# Patient Record
Sex: MALE | Race: WHITE | NOT HISPANIC OR LATINO | Employment: OTHER | URBAN - METROPOLITAN AREA
[De-identification: names, ages, dates, MRNs, and addresses within clinical notes are randomized per-mention and may not be internally consistent; named-entity substitution may affect disease eponyms.]

---

## 2017-05-10 ENCOUNTER — HOSPITAL ENCOUNTER (OUTPATIENT)
Dept: RADIATION ONCOLOGY | Facility: HOSPITAL | Age: 77
Setting detail: RADIATION/ONCOLOGY SERIES
Discharge: HOME | End: 2017-05-10

## 2017-05-10 ENCOUNTER — OFFICE VISIT (OUTPATIENT)
Dept: RADIATION ONCOLOGY | Facility: HOSPITAL | Age: 77
End: 2017-05-10

## 2017-05-10 VITALS
HEIGHT: 67 IN | TEMPERATURE: 97.2 F | HEART RATE: 67 BPM | DIASTOLIC BLOOD PRESSURE: 81 MMHG | OXYGEN SATURATION: 93 % | BODY MASS INDEX: 29.73 KG/M2 | WEIGHT: 189.4 LBS | RESPIRATION RATE: 18 BRPM | SYSTOLIC BLOOD PRESSURE: 168 MMHG

## 2017-05-10 DIAGNOSIS — C61 PROSTATE CANCER (HCC): Primary | ICD-10-CM

## 2017-05-10 PROCEDURE — G0463 HOSPITAL OUTPT CLINIC VISIT: HCPCS

## 2017-05-10 RX ORDER — NITROGLYCERIN 0.4 MG/1
0.4 TABLET SUBLINGUAL
COMMUNITY

## 2017-05-10 RX ORDER — METOPROLOL TARTRATE 50 MG/1
TABLET, FILM COATED ORAL
Refills: 1 | COMMUNITY
Start: 2017-03-03

## 2017-05-10 RX ORDER — DIAZEPAM 5 MG/1
5 TABLET ORAL 2 TIMES DAILY PRN
COMMUNITY
End: 2018-02-22

## 2017-05-10 RX ORDER — MELOXICAM 7.5 MG/1
TABLET ORAL
Refills: 3 | COMMUNITY
Start: 2017-04-09 | End: 2018-02-22

## 2017-05-10 RX ORDER — MIRTAZAPINE 30 MG/1
TABLET, FILM COATED ORAL
Refills: 3 | COMMUNITY
Start: 2017-03-30

## 2017-05-10 RX ORDER — PANTOPRAZOLE SODIUM 40 MG/1
TABLET, DELAYED RELEASE ORAL
Refills: 3 | COMMUNITY
Start: 2017-03-29

## 2017-05-10 RX ORDER — ESCITALOPRAM OXALATE 10 MG/1
TABLET ORAL
Refills: 2 | COMMUNITY
Start: 2017-04-09 | End: 2017-06-19

## 2017-05-10 RX ORDER — BLOOD SUGAR DIAGNOSTIC
STRIP MISCELLANEOUS
Refills: 4 | COMMUNITY
Start: 2017-04-26

## 2017-05-10 RX ORDER — AMLODIPINE BESYLATE 10 MG/1
TABLET ORAL
Refills: 3 | COMMUNITY
Start: 2017-03-29

## 2017-05-10 RX ORDER — DOXYCYCLINE 100 MG/1
CAPSULE ORAL
Refills: 0 | COMMUNITY
Start: 2017-03-21 | End: 2017-06-19

## 2017-05-10 RX ORDER — LOSARTAN POTASSIUM 50 MG/1
TABLET ORAL
Refills: 3 | COMMUNITY
Start: 2017-03-29 | End: 2018-02-22

## 2017-05-10 RX ORDER — ASPIRIN 81 MG/1
81 TABLET ORAL DAILY
COMMUNITY

## 2017-05-10 RX ORDER — SIMVASTATIN 80 MG
20 TABLET ORAL
Refills: 0 | COMMUNITY
Start: 2017-04-01 | End: 2018-02-22

## 2017-05-10 RX ORDER — INSULIN GLARGINE 300 U/ML
INJECTION, SOLUTION SUBCUTANEOUS
Refills: 5 | COMMUNITY
Start: 2017-04-24

## 2017-05-12 ENCOUNTER — APPOINTMENT (OUTPATIENT)
Dept: OTHER | Facility: HOSPITAL | Age: 77
End: 2017-05-12
Attending: RADIOLOGY

## 2017-05-12 DIAGNOSIS — Z00.6 ENCOUNTER FOR EXAMINATION FOR NORMAL COMPARISON OR CONTROL IN CLINICAL RESEARCH PROGRAM: ICD-10-CM

## 2017-05-17 ENCOUNTER — TELEPHONE (OUTPATIENT)
Dept: SOCIAL WORK | Facility: HOSPITAL | Age: 77
End: 2017-05-17

## 2017-05-30 DIAGNOSIS — C61 PROSTATE CANCER (HCC): Primary | ICD-10-CM

## 2017-06-09 ENCOUNTER — TELEPHONE (OUTPATIENT)
Dept: RADIATION ONCOLOGY | Facility: HOSPITAL | Age: 77
End: 2017-06-09

## 2017-06-09 NOTE — TELEPHONE ENCOUNTER
Spoke with pt's wife. Checking with pt to make sure fiducial placement was still on schedule with Dr. Hampton for 6/12/17. Pt's wife confirmed.  Also notified of the following appts:  6-19-17 @ 1528 [facility]  @ 0930 CK sim  @ 1000 CT sim  @ 1130 MRI for 1200 scan    We also discussed importance of  prostate protocol diet and gas-x 2 days prior and an enema the day of.   Instructed to be NPO except fluids for up to 2 hours prior to MRI. Wife verbalized understanding.

## 2017-06-15 ENCOUNTER — TELEPHONE (OUTPATIENT)
Dept: NUTRITION | Facility: HOSPITAL | Age: 77
End: 2017-06-15

## 2017-06-19 ENCOUNTER — DOCUMENTATION (OUTPATIENT)
Dept: RADIATION ONCOLOGY | Facility: HOSPITAL | Age: 77
End: 2017-06-19

## 2017-06-19 ENCOUNTER — OFFICE VISIT (OUTPATIENT)
Dept: RADIATION ONCOLOGY | Facility: HOSPITAL | Age: 77
End: 2017-06-19

## 2017-06-19 ENCOUNTER — HOSPITAL ENCOUNTER (OUTPATIENT)
Dept: MRI IMAGING | Facility: HOSPITAL | Age: 77
Discharge: HOME | End: 2017-06-19
Attending: RADIOLOGY

## 2017-06-19 ENCOUNTER — HOSPITAL ENCOUNTER (OUTPATIENT)
Dept: RADIATION ONCOLOGY | Facility: HOSPITAL | Age: 77
Setting detail: RADIATION/ONCOLOGY SERIES
Discharge: HOME | End: 2017-06-19

## 2017-06-19 VITALS
WEIGHT: 188.3 LBS | RESPIRATION RATE: 20 BRPM | DIASTOLIC BLOOD PRESSURE: 77 MMHG | SYSTOLIC BLOOD PRESSURE: 131 MMHG | OXYGEN SATURATION: 94 % | TEMPERATURE: 97.3 F | HEART RATE: 70 BPM | BODY MASS INDEX: 29.71 KG/M2

## 2017-06-19 DIAGNOSIS — C61 PROSTATE CANCER (HCC): Primary | ICD-10-CM

## 2017-06-19 PROCEDURE — G0463 HOSPITAL OUTPT CLINIC VISIT: HCPCS

## 2017-06-19 RX ORDER — INSULIN ASPART 100 [IU]/ML
INJECTION, SOLUTION INTRAVENOUS; SUBCUTANEOUS
Refills: 3 | COMMUNITY
Start: 2017-06-16

## 2017-06-19 RX ORDER — PEN NEEDLE, DIABETIC 32GX 5/32"
NEEDLE, DISPOSABLE MISCELLANEOUS
Refills: 11 | COMMUNITY
Start: 2017-05-08

## 2017-06-19 NOTE — PROGRESS NOTES
RE-EVALUATION    PATIENT:                                                      Papo Ann  :                                                          1940  DATE:                          2017   DIAGNOSIS:     Prostate cancer    Staging form: Prostate, AJCC V7      Clinical: Stage IIB (T1c, N0, M0, PSA: 20 or greater, [place] 7) - Signed by Magdiel Greenwood MD on 5/10/2017       BRIEF HISTORY:  The patient is a very pleasant 77 y.o. male  with prostate cancer.  He tolerated fiducial placement with bleeding for approximately one day which has resolved.  He was on Levaquin around the time of procedure.  Complains of increased dysuria.  He has been on diet and prep for SBRT for the past 2 days.    Allergies   Allergen Reactions   • Ceclor [Cefaclor] Hives   • Codeine Hives   • Erythromycin Hives   • Keflex [Cephalexin] Hives   • Other Hives     Potaba caps 500mg   • Penicillins Hives   • Sulfa Antibiotics Hives   • Adhesive Tape Other (See Comments)     blister   • Glucophage [Metformin] Diarrhea   • Hctz [Hydrochlorothiazide] Hives   • Lisinopril Cough       Review of Systems   Constitutional: Negative.    HENT:  Negative.    Eyes: Negative.    Respiratory: Positive for shortness of breath.    Cardiovascular: Positive for leg swelling.   Gastrointestinal: Negative.    Endocrine: Negative.    Genitourinary: Positive for dysuria.    Musculoskeletal: Positive for neck pain and neck stiffness.   Skin: Positive for itching.   Neurological: Positive for dizziness and light-headedness.   Hematological: Bruises/bleeds easily.   Psychiatric/Behavioral: Negative.           IPSS Questionnaire (AUA-7):  Over the past month…     1) Incomplete Emptying  How often have you had a sensation of not emptying your bladder?  0 - Not at all   2) Frequency  How often have you had to urinate less than every two hours? 2 - Less than half the time   3) Intermittency  How often have you found you stopped and started again several  times when you urinated?  1 - Less than 1 time in 5   4) Urgency  How often have you found it difficult to postpone urination?  2 - Less than half the time   5) Weak Stream  How often have you had a weak urinary stream?  0 - Not at all   6) Straining  How often have you had to push or strain to begin urination?  0 - Not at all   7) Nocturia  How many times did you typically get up at night to urinate?  2 - 2 times   Total Score: 7         Quality of life due to urinary symptoms:  If you were to spend the rest of your life with your urinary condition the way it is now, how would you feel about that? 3-Mixed   Urine Leakage (Incontinence) 1-Mild (A few drops a day, no pad use)      Sexual Health Inventory  Current Status     1) How do you rate your confidence that you could achieve and keep an erection? 1-Very Low   2) When you had erections with sexual stimulation, how often were your erections hard enough for penetration (entering your partner)? 1-Almost never or never   3) During sexual intercourse, how often were you able to maintain your erection after you had penetrated (entered) into your partner? 1-Almost never or never   4) During sexual intercourse, how difficult was it to maintain your erection to completion of intercourse? 1-Extremely difficult   5) When you attempted sexual intercourse, how often was it satisfactory to you? 1-Almost never or never   Total Score: 5         Bowel Health Inventory  Current Status: 0-No problems, no rectal bleeding, no discharge, less then 5 bowel movements a day               Objective   VITAL SIGNS:   Vitals:    06/19/17 0829   BP: 131/77   Pulse: 70   Resp: 20   Temp: 97.3 °F (36.3 °C)   TempSrc: Temporal Artery    SpO2: 94%   Weight: 188 lb 4.8 oz (85.4 kg)   PainSc: 0-No pain        KPS 90%    Physical Exam         The following portions of the patient's history were reviewed and updated as appropriate: allergies, current medications, past family history, past medical  history, past social history, past surgical history and problem list.      IMPRESSION:  Prostate cancer.  He almost ready to begin treatment planning for CyberKnife SBRT.  He currently has urinary tract/prostatitis symptoms.    RECOMMENDATIONS:  I would like to delay treatment planning CT and MRI for approximately one week.  He will return to Dr. Hampton for evaluation and antibiotics if necessary.     Magdiel Greenwood MD

## 2017-06-22 ENCOUNTER — TELEPHONE (OUTPATIENT)
Dept: NUTRITION | Facility: HOSPITAL | Age: 77
End: 2017-06-22

## 2017-06-26 ENCOUNTER — HOSPITAL ENCOUNTER (OUTPATIENT)
Dept: MRI IMAGING | Facility: HOSPITAL | Age: 77
Discharge: HOME | End: 2017-06-26
Attending: RADIOLOGY | Admitting: RADIOLOGY

## 2017-06-26 ENCOUNTER — HOSPITAL ENCOUNTER (OUTPATIENT)
Dept: RADIATION ONCOLOGY | Facility: HOSPITAL | Age: 77
Discharge: HOME | End: 2017-06-26

## 2017-06-26 ENCOUNTER — OFFICE VISIT (OUTPATIENT)
Dept: RADIATION ONCOLOGY | Facility: HOSPITAL | Age: 77
End: 2017-06-26

## 2017-06-26 VITALS
WEIGHT: 192.2 LBS | RESPIRATION RATE: 16 BRPM | BODY MASS INDEX: 30.17 KG/M2 | HEART RATE: 80 BPM | HEIGHT: 67 IN | TEMPERATURE: 98 F | DIASTOLIC BLOOD PRESSURE: 82 MMHG | SYSTOLIC BLOOD PRESSURE: 143 MMHG

## 2017-06-26 DIAGNOSIS — C61 PROSTATE CANCER (HCC): ICD-10-CM

## 2017-06-26 DIAGNOSIS — C61 PROSTATE CANCER (HCC): Primary | ICD-10-CM

## 2017-06-26 PROCEDURE — G0463 HOSPITAL OUTPT CLINIC VISIT: HCPCS

## 2017-06-26 PROCEDURE — 77290 THER RAD SIMULAJ FIELD CPLX: CPT | Performed by: RADIOLOGY

## 2017-06-26 PROCEDURE — 72195 MRI PELVIS W/O DYE: CPT

## 2017-06-26 PROCEDURE — 77470 SPECIAL RADIATION TREATMENT: CPT | Performed by: RADIOLOGY

## 2017-06-26 RX ORDER — CIPROFLOXACIN 500 MG/1
TABLET, FILM COATED ORAL
Refills: 0 | COMMUNITY
Start: 2017-06-20 | End: 2017-08-21

## 2017-06-26 NOTE — PROGRESS NOTES
RE-EVALUATION NOTE    NAME:      Papo Ann  :                                                          1940  DATE OF RE-EVALUATION:                       2017   REASON FOR RE-EVALUATION:           Prostate cancer          Staging form: Prostate, AJCC V7            Clinical: Stage IIB (T1c, N0, M0, PSA: 20 or greater, [place] 7)          BRIEF HISTORY:  Papo Ann  is a very pleasant 77 y.o. male  with intermediate risk prostate cancer.  He returned today for simulation in preparation for CyberKnife SBRT.  This is his second attempt since he had developed prostatitis symptoms after fiducial placement and has now been on antibiotics Cipro.  Dysuria and frequency have resolved.  He has mild irritative symptoms.    Allergies   Allergen Reactions   • Ceclor [Cefaclor] Hives   • Codeine Hives   • Erythromycin Hives   • Keflex [Cephalexin] Hives   • Other Hives     Potaba caps 500mg   • Penicillins Hives   • Sulfa Antibiotics Hives   • Adhesive Tape Other (See Comments)     blister   • Glucophage [Metformin] Diarrhea   • Hctz [Hydrochlorothiazide] Hives   • Lisinopril Cough       Social History   Substance Use Topics   • Smoking status: Former Smoker     Packs/day: 3.00     Quit date:    • Smokeless tobacco: None   • Alcohol use Yes      Comment: twice per year       Past Medical History:   Diagnosis Date   • Chronic kidney disease     renal failure no dialysis   • Diverticula of colon    • Hypertension    • Kidney stone 2015   • Pneumothorax, spontaneous, tension    • Prostate cancer    • Skin cancer     Dr. Cisco Prater   • Ulcer duodenal hemorrhage        family history includes Alzheimer's disease in his mother; Heart disease in his mother; Heart failure in his father; Lung cancer in his mother; Prostate cancer in his son.     Past Surgical History:   Procedure Laterality Date   • COLONOSCOPY      Dr. Chairez   • CORONARY STENT PLACEMENT  10/21/2003    Dr. Izaguirre   •  ESOPHAGEAL DILATATION  2012    Dr. Weinstein polyps removed also   • HAND SURGERY Left 2008    Dr. Marty Sommers   • HAND SURGERY Right 2010    Dr. Lai   • HEMORRHOIDECTOMY  1976   • PROSTATE BIOPSY  03/03/2017    [facility]   • PROSTATE FIDUCIAL MARKER PLACEMENT  06/12/2017   • PROSTATE SURGERY  1999    turp Dr. Hampton        Review of Systems   Genitourinary:         Pt has intermittent itching.  Other symptoms have improved since starting antibiotic.        IPSS Questionnaire (AUA-7):  Over the past month…      1) Incomplete Emptying  How often have you had a sensation of not emptying your bladder?  0 - Not at all   2) Frequency  How often have you had to urinate less than every two hours? 2 - Less than half the time   3) Intermittency  How often have you found you stopped and started again several times when you urinated?  1 - Less than 1 time in 5   4) Urgency  How often have you found it difficult to postpone urination?  2 - Less than half the time   5) Weak Stream  How often have you had a weak urinary stream?  0 - Not at all   6) Straining  How often have you had to push or strain to begin urination?  0 - Not at all   7) Nocturia  How many times did you typically get up at night to urinate?  2 - 2 times   Total Score: 7           Quality of life due to urinary symptoms:  If you were to spend the rest of your life with your urinary condition the way it is now, how would you feel about that? 3-Mixed   Urine Leakage (Incontinence) 1-Mild (A few drops a day, no pad use)       Sexual Health Inventory  Current Status      1) How do you rate your confidence that you could achieve and keep an erection? 1-Very Low   2) When you had erections with sexual stimulation, how often were your erections hard enough for penetration (entering your partner)? 1-Almost never or never   3) During sexual intercourse, how often were you able to maintain your erection after you had penetrated (entered) into your partner? 1-Almost  "never or never   4) During sexual intercourse, how difficult was it to maintain your erection to completion of intercourse? 1-Extremely difficult   5) When you attempted sexual intercourse, how often was it satisfactory to you? 1-Almost never or never   Total Score: 5           Bowel Health Inventory  Current Status: 0-No problems, no rectal bleeding, no discharge, less then 5 bowel movements a day            Objective   VITAL SIGNS:   Vitals:    06/26/17 0829   BP: 143/82   Pulse: 80   Resp: 16   Temp: 98 °F (36.7 °C)   Weight: 192 lb 3.2 oz (87.2 kg)   Height: 66.75\" (169.5 cm)   PainSc: 0-No pain        KPS       90%    Physical Exam         The following portions of the patient's history were reviewed and updated as appropriate: allergies, current medications, past family history, past medical history, past social history, past surgical history and problem list.    Assessment      IMPRESSION:  Prostate cancer    Staging form: Prostate, AJCC V7      Clinical: Stage IIB (T1c, N0, M0, PSA: 20 or greater, [place] 7)       RECOMMENDATIONS:  Treatment planning CT and MRI studies today in preparation for CyberKnife SBRT.         Magdiel Greenwood MD      Errors in dictation may reflect use of voice recognition software and not all errors in transcription may have been detected prior to signing.  "

## 2017-06-29 PROCEDURE — 77295 3-D RADIOTHERAPY PLAN: CPT | Performed by: RADIOLOGY

## 2017-06-29 PROCEDURE — 77334 RADIATION TREATMENT AID(S): CPT | Performed by: RADIOLOGY

## 2017-06-29 PROCEDURE — 77300 RADIATION THERAPY DOSE PLAN: CPT | Performed by: RADIOLOGY

## 2017-06-29 PROCEDURE — 77370 RADIATION PHYSICS CONSULT: CPT | Performed by: RADIOLOGY

## 2017-07-03 ENCOUNTER — HOSPITAL ENCOUNTER (OUTPATIENT)
Dept: RADIATION ONCOLOGY | Facility: HOSPITAL | Age: 77
Setting detail: RADIATION/ONCOLOGY SERIES
Discharge: HOME | End: 2017-07-03

## 2017-07-10 ENCOUNTER — HOSPITAL ENCOUNTER (OUTPATIENT)
Dept: RADIATION ONCOLOGY | Facility: HOSPITAL | Age: 77
Discharge: HOME | End: 2017-07-10

## 2017-07-10 PROCEDURE — 77336 RADIATION PHYSICS CONSULT: CPT | Performed by: RADIOLOGY

## 2017-07-10 PROCEDURE — 77373 STRTCTC BDY RAD THER TX DLVR: CPT | Performed by: RADIOLOGY

## 2017-07-10 PROCEDURE — 77290 THER RAD SIMULAJ FIELD CPLX: CPT | Performed by: RADIOLOGY

## 2017-07-11 ENCOUNTER — HOSPITAL ENCOUNTER (OUTPATIENT)
Dept: RADIATION ONCOLOGY | Facility: HOSPITAL | Age: 77
Discharge: HOME | End: 2017-07-11

## 2017-07-11 PROCEDURE — 77280 THER RAD SIMULAJ FIELD SMPL: CPT | Performed by: RADIOLOGY

## 2017-07-11 PROCEDURE — 77373 STRTCTC BDY RAD THER TX DLVR: CPT | Performed by: RADIOLOGY

## 2017-07-12 ENCOUNTER — HOSPITAL ENCOUNTER (OUTPATIENT)
Dept: RADIATION ONCOLOGY | Facility: HOSPITAL | Age: 77
Discharge: HOME | End: 2017-07-12

## 2017-07-12 PROCEDURE — 77280 THER RAD SIMULAJ FIELD SMPL: CPT | Performed by: RADIOLOGY

## 2017-07-12 PROCEDURE — 77373 STRTCTC BDY RAD THER TX DLVR: CPT | Performed by: RADIOLOGY

## 2017-07-13 ENCOUNTER — HOSPITAL ENCOUNTER (OUTPATIENT)
Dept: RADIATION ONCOLOGY | Facility: HOSPITAL | Age: 77
Discharge: HOME | End: 2017-07-13

## 2017-07-13 PROCEDURE — 77373 STRTCTC BDY RAD THER TX DLVR: CPT | Performed by: RADIOLOGY

## 2017-07-13 PROCEDURE — 77280 THER RAD SIMULAJ FIELD SMPL: CPT | Performed by: RADIOLOGY

## 2017-07-14 ENCOUNTER — HOSPITAL ENCOUNTER (OUTPATIENT)
Dept: RADIATION ONCOLOGY | Facility: HOSPITAL | Age: 77
Discharge: HOME | End: 2017-07-14

## 2017-07-14 PROCEDURE — 77373 STRTCTC BDY RAD THER TX DLVR: CPT | Performed by: RADIOLOGY

## 2017-07-14 PROCEDURE — 77280 THER RAD SIMULAJ FIELD SMPL: CPT | Performed by: RADIOLOGY

## 2017-07-19 ENCOUNTER — TELEPHONE (OUTPATIENT)
Dept: RADIATION ONCOLOGY | Facility: HOSPITAL | Age: 77
End: 2017-07-19

## 2017-07-19 DIAGNOSIS — C61 PROSTATE CANCER (HCC): Primary | ICD-10-CM

## 2017-07-19 RX ORDER — METHYLPREDNISOLONE 4 MG/1
TABLET ORAL
Qty: 21 TABLET | Refills: 0 | Status: SHIPPED | OUTPATIENT
Start: 2017-07-19 | End: 2017-08-21

## 2017-07-19 NOTE — TELEPHONE ENCOUNTER
Pt called with c/o slight burning with urination. Severe perineal pain and pain when having a bowel.  He states that his pain is similar to when he had a prostate infection. Discussed with AB and sent a Rx for medrol pack and instructed to buy cystex over the counter and take as directed. Pt verbalized understanding.

## 2017-08-21 ENCOUNTER — OFFICE VISIT (OUTPATIENT)
Dept: RADIATION ONCOLOGY | Facility: HOSPITAL | Age: 77
End: 2017-08-21

## 2017-08-21 ENCOUNTER — HOSPITAL ENCOUNTER (OUTPATIENT)
Dept: RADIATION ONCOLOGY | Facility: HOSPITAL | Age: 77
Setting detail: RADIATION/ONCOLOGY SERIES
Discharge: HOME | End: 2017-08-21

## 2017-08-21 VITALS
SYSTOLIC BLOOD PRESSURE: 167 MMHG | DIASTOLIC BLOOD PRESSURE: 87 MMHG | HEART RATE: 82 BPM | RESPIRATION RATE: 20 BRPM | OXYGEN SATURATION: 94 % | BODY MASS INDEX: 30.64 KG/M2 | WEIGHT: 194.2 LBS | TEMPERATURE: 97.7 F

## 2017-08-21 DIAGNOSIS — C61 PROSTATE CANCER (HCC): Primary | ICD-10-CM

## 2017-08-21 PROCEDURE — G0463 HOSPITAL OUTPT CLINIC VISIT: HCPCS

## 2017-08-21 RX ORDER — ESCITALOPRAM OXALATE 10 MG/1
TABLET ORAL
Refills: 2 | COMMUNITY
Start: 2017-07-05 | End: 2017-08-21

## 2017-08-21 NOTE — PROGRESS NOTES
FOLLOW UP NOTE    PATIENT:                                                      Papo Ann  MEDICAL RECORD #:                        [id number]  :                                                          1940  COMPLETION DATE:    17  DIAGNOSIS:     Prostate cancer    Staging form: Prostate, AJCC V7      Clinical: Stage IIB (T1c, N0, M0, PSA: 20 or greater, [place] 7) - Signed by Magdiel Greenwood MD on 5/10/2017      BRIEF HISTORY:    Initial follow-up visit after CyberKnife SBRT for prostate cancer, [place]'s grade 4+3 = 7, pretreatment PSA 25.8.  He's having mild residual dysuria but has not been able to take Flomax due to severe allergic reaction.  He is recovering after a flare of hemorrhoids.  Bowels are becoming more normal.  He has chronic fatigue which is also improving.  He believes erectile dysfunction is actually improved since treatment.  He has not yet had a post treatment PSA test.    MEDICATIONS: Medication reconciliation for the patient was reviewed and confirmed in the electronic medical record.    Review of Systems   Constitutional: Positive for fatigue.   HENT:   Positive for hearing loss.    Eyes: Negative.    Respiratory: Positive for cough, shortness of breath and wheezing.    Cardiovascular: Positive for leg swelling.   Gastrointestinal: Positive for rectal pain (hemrroids).   Genitourinary: Positive for dysuria.    Musculoskeletal: Negative.    Skin: Positive for itching (psoriaisis) and rash.   Neurological: Positive for headaches.   Hematological: Bruises/bleeds easily.   Psychiatric/Behavioral: Positive for depression. The patient is nervous/anxious.           IPSS Questionnaire (AUA-7):  Over the past month…      1) Incomplete Emptying  How often have you had a sensation of not emptying your bladder?  0 - Not at all   2) Frequency  How often have you had to urinate less than every two hours? 2 - Less than half the time   3) Intermittency  How often have you found you  stopped and started again several times when you urinated?  1 - Less than 1 time in 5   4) Urgency  How often have you found it difficult to postpone urination?  2 - Less than half the time   5) Weak Stream  How often have you had a weak urinary stream?  0 - Not at all   6) Straining  How often have you had to push or strain to begin urination?  0 - Not at all   7) Nocturia  How many times did you typically get up at night to urinate?  2 - 2 times   Total Score: 7           Quality of life due to urinary symptoms:  If you were to spend the rest of your life with your urinary condition the way it is now, how would you feel about that? 3-Mixed   Urine Leakage (Incontinence) 1-Mild (A few drops a day, no pad use)       Sexual Health Inventory  Current Status      1) How do you rate your confidence that you could achieve and keep an erection? 1-Very Low   2) When you had erections with sexual stimulation, how often were your erections hard enough for penetration (entering your partner)? 1-Almost never or never   3) During sexual intercourse, how often were you able to maintain your erection after you had penetrated (entered) into your partner? 1-Almost never or never   4) During sexual intercourse, how difficult was it to maintain your erection to completion of intercourse? 1-Extremely difficult   5) When you attempted sexual intercourse, how often was it satisfactory to you? 1-Almost never or never   Total Score: 5           Bowel Health Inventory  Current Status: 0-No problems, no rectal bleeding, no discharge, less then 5 bowel movements a day               KPS 80%    Physical Exam   Constitutional: He is oriented to person, place, and time. He appears well-developed and well-nourished.   HENT:   Head: Normocephalic and atraumatic.   Neck: Normal range of motion. Neck supple.   Cardiovascular: Normal rate, regular rhythm and normal heart sounds.    No murmur heard.  Pulmonary/Chest: Effort normal and breath sounds  normal. He has no wheezes. He has no rales.   Abdominal: Soft. Bowel sounds are normal. He exhibits no distension. There is no hepatosplenomegaly. There is no tenderness.   Musculoskeletal: Normal range of motion. He exhibits no edema or tenderness.   Lymphadenopathy:     He has no cervical adenopathy.     He has no axillary adenopathy.        Right: No supraclavicular adenopathy present.        Left: No supraclavicular adenopathy present.   Neurological: He is alert and oriented to person, place, and time. He has normal strength. No sensory deficit.   Skin: Skin is warm and dry.   Psychiatric: He has a normal mood and affect. His behavior is normal. Judgment and thought content normal.   Nursing note and vitals reviewed.      VITAL SIGNS:   Vitals:    08/21/17 0843   BP: 167/87   Pulse: 82   Resp: 20   Temp: 97.7 °F (36.5 °C)   TempSrc: Temporal Artery    SpO2: 94%   Weight: 194 lb 3.2 oz (88.1 kg)   PainSc: 2  Comment: hemrroid pain   PainLoc: Rectum       The following portions of the patient's history were reviewed and updated as appropriate: allergies, current medications, past family history, past medical history, past social history, past surgical history and problem list.         Papo was seen today for prostate cancer.    Diagnoses and all orders for this visit:    Prostate cancer       IMPRESSION:  Intermediate risk prostate cancer.  He tolerated CyberKnife SBRT well even though he has not been able to take and alpha-blocker to help less than urinary symptoms.  Bowels, bladder function and energy are slowly improving.    RECOMMENDATIONS:  Continue urologic follow-up with Dr. Hampton.    Return in about 6 months (around 2/21/2018) for Office Visit.    Magdiel Greenwood MD    Errors in dictation may reflect use of voice recognition software and not all errors in transcription may have been detected prior to signing.

## 2018-02-22 ENCOUNTER — HOSPITAL ENCOUNTER (OUTPATIENT)
Dept: RADIATION ONCOLOGY | Facility: HOSPITAL | Age: 78
Setting detail: RADIATION/ONCOLOGY SERIES
Discharge: HOME | End: 2018-02-22

## 2018-02-22 ENCOUNTER — OFFICE VISIT (OUTPATIENT)
Dept: RADIATION ONCOLOGY | Facility: HOSPITAL | Age: 78
End: 2018-02-22

## 2018-02-22 VITALS
RESPIRATION RATE: 20 BRPM | SYSTOLIC BLOOD PRESSURE: 169 MMHG | TEMPERATURE: 97.3 F | WEIGHT: 197.6 LBS | BODY MASS INDEX: 31.18 KG/M2 | DIASTOLIC BLOOD PRESSURE: 73 MMHG | HEART RATE: 63 BPM | OXYGEN SATURATION: 94 %

## 2018-02-22 DIAGNOSIS — C61 PROSTATE CANCER (HCC): Primary | ICD-10-CM

## 2018-02-22 PROCEDURE — G0463 HOSPITAL OUTPT CLINIC VISIT: HCPCS

## 2018-02-22 RX ORDER — CLOPIDOGREL BISULFATE 75 MG/1
75 TABLET ORAL DAILY
COMMUNITY

## 2018-02-22 RX ORDER — FUROSEMIDE 40 MG/1
40 TABLET ORAL 2 TIMES DAILY
COMMUNITY

## 2018-02-22 RX ORDER — ZOLPIDEM TARTRATE 5 MG/1
5 TABLET ORAL NIGHTLY PRN
COMMUNITY

## 2018-02-22 RX ORDER — ACETAMINOPHEN 160 MG
2000 TABLET,DISINTEGRATING ORAL DAILY
COMMUNITY

## 2018-02-22 RX ORDER — ATORVASTATIN CALCIUM 40 MG/1
40 TABLET, FILM COATED ORAL DAILY
COMMUNITY
End: 2018-03-07 | Stop reason: HOSPADM

## 2018-02-22 NOTE — PROGRESS NOTES
FOLLOW UP NOTE    PATIENT:                                                      Papo Ann  MEDICAL RECORD #:                        [id number]  :                                                          1940  COMPLETION DATE:    17  DIAGNOSIS:     Prostate cancer    Staging form: Prostate, AJCC V7    - Clinical: Stage IIB (T1c, N0, M0, PSA: 20 or greater, [place] 7) - Signed by Magdiel Greenwood MD on 5/10/2017      BRIEF HISTORY:    Routine follow-up on patient treated with CyberKnife SBRT for high intermediate risk prostate cancer.  Pretreatment PSA maximum was 25.8 ng/ml.  He's had excellent biochemical response with PSA ortega 0.36 ng/ml on 2017.  He is voiding well with mild dysuria and mild frequency but does not take and alpha-blocker.  He has a severe allergy to Flomax.  He has had a couple episodes of hematuria but has not been diagnosed with a urinary tract infection.  Bowels are usually formed and normal with occasional loose stool or urgency.  He no longer experiences hemorrhoidal problems.  Since his last visit he is undergone cardiac stenting for dyspnea related to cardiac etiology.  He has improving renal insufficiency after stopping medications.  His overall continuing to improve and energy and stamina.    MEDICATIONS: Medication reconciliation for the patient was reviewed and confirmed in the electronic medical record.    Review of Systems   Constitutional: Positive for fatigue.   HENT:  Negative.    Eyes: Negative.    Respiratory: Positive for shortness of breath.    Cardiovascular: Negative.    Gastrointestinal: Negative.    Endocrine: Negative.    Genitourinary: Positive for dysuria.    Musculoskeletal: Positive for back pain.   Skin: Negative.    Neurological: Negative.    Hematological: Bruises/bleeds easily.   Psychiatric/Behavioral: The patient is nervous/anxious.           IPSS Questionnaire (AUA-7):  Over the past month…      1) Incomplete Emptying  How often have you  had a sensation of not emptying your bladder?  0 - Not at all   2) Frequency  How often have you had to urinate less than every two hours? 2 - Less than half the time   3) Intermittency  How often have you found you stopped and started again several times when you urinated?  1 - Less than 1 time in 5   4) Urgency  How often have you found it difficult to postpone urination?  2 - Less than half the time   5) Weak Stream  How often have you had a weak urinary stream?  0 - Not at all   6) Straining  How often have you had to push or strain to begin urination?  0 - Not at all   7) Nocturia  How many times did you typically get up at night to urinate?  2 - 2 times   Total Score: 7           Quality of life due to urinary symptoms:  If you were to spend the rest of your life with your urinary condition the way it is now, how would you feel about that? 3-Mixed   Urine Leakage (Incontinence) 1-Mild (A few drops a day, no pad use)       Sexual Health Inventory  Current Status      1) How do you rate your confidence that you could achieve and keep an erection? 1-Very Low   2) When you had erections with sexual stimulation, how often were your erections hard enough for penetration (entering your partner)? 1-Almost never or never   3) During sexual intercourse, how often were you able to maintain your erection after you had penetrated (entered) into your partner? 1-Almost never or never   4) During sexual intercourse, how difficult was it to maintain your erection to completion of intercourse? 1-Extremely difficult   5) When you attempted sexual intercourse, how often was it satisfactory to you? 1-Almost never or never   Total Score: 5           Bowel Health Inventory  Current Status: 0-No problems, no rectal bleeding, no discharge, less then 5 bowel movements a day                     KPS 80%    Physical Exam   Constitutional: He is oriented to person, place, and time. He appears well-developed and well-nourished.   HENT:    Head: Normocephalic and atraumatic.   Neck: Normal range of motion. Neck supple.   Cardiovascular: Normal rate, regular rhythm and normal heart sounds.    No murmur heard.  Pulmonary/Chest: Effort normal and breath sounds normal. He has no wheezes. He has no rales.   Abdominal: Soft. Bowel sounds are normal. He exhibits no distension. There is no hepatosplenomegaly. There is no tenderness.   Genitourinary: Prostate is not enlarged (prostate is now small, flat and smooth.  The previously palpated nodule on the left has resolved.  Seminal vesicles not palpable.).   Musculoskeletal: Normal range of motion. He exhibits no edema or tenderness.   Lymphadenopathy:     He has no cervical adenopathy.     He has no axillary adenopathy.        Right: No supraclavicular adenopathy present.        Left: No supraclavicular adenopathy present.   Neurological: He is alert and oriented to person, place, and time. He has normal strength. No sensory deficit.   Skin: Skin is warm and dry.   Psychiatric: He has a normal mood and affect. His behavior is normal. Judgment and thought content normal.   Nursing note and vitals reviewed.      VITAL SIGNS:   Vitals:    02/22/18 0931   BP: 169/73   Pulse: 63   Resp: 20   Temp: 97.3 °F (36.3 °C)   TempSrc: Temporal Artery    SpO2: 94%   Weight: 89.6 kg (197 lb 9.6 oz)   PainSc: 0-No pain       The following portions of the patient's history were reviewed and updated as appropriate: allergies, current medications, past family history, past medical history, past social history, past surgical history and problem list.         Papo was seen today for prostate cancer.    Diagnoses and all orders for this visit:    Prostate cancer       IMPRESSION:  Prostate cancer, [place]'s grade 4+3 = 7, clinical stage IIB (T1c, N0, M0).    Maximum pretreatment PSA 25.8 ng/ml has decreased to 0.36 ng/ml indicating excellent, complete biochemical response.  MELANIA exam is also normalized.    He is in remission.  He  appears to have tolerated SBRT very well.    RECOMMENDATIONS:  Continue urologic surveillance with Dr. Hampton.    Return in about 1 year (around 2/22/2019) for Office Visit.    Magdiel Greenwood MD    Errors in dictation may reflect use of voice recognition software and not all errors in transcription may have been detected prior to signing.

## 2018-03-06 ENCOUNTER — APPOINTMENT (OUTPATIENT)
Dept: GENERAL RADIOLOGY | Facility: HOSPITAL | Age: 78
End: 2018-03-06

## 2018-03-06 ENCOUNTER — HOSPITAL ENCOUNTER (INPATIENT)
Facility: HOSPITAL | Age: 78
LOS: 1 days | Discharge: HOME | End: 2018-03-07
Attending: EMERGENCY MEDICINE | Admitting: FAMILY MEDICINE

## 2018-03-06 ENCOUNTER — APPOINTMENT (OUTPATIENT)
Dept: MRI IMAGING | Facility: HOSPITAL | Age: 78
End: 2018-03-06

## 2018-03-06 DIAGNOSIS — I63.9 CEREBROVASCULAR ACCIDENT (CVA), UNSPECIFIED MECHANISM (HCC): Primary | ICD-10-CM

## 2018-03-06 DIAGNOSIS — Z78.9 IMPAIRED MOBILITY AND ADLS: ICD-10-CM

## 2018-03-06 DIAGNOSIS — Z74.09 IMPAIRED FUNCTIONAL MOBILITY, BALANCE, GAIT, AND ENDURANCE: ICD-10-CM

## 2018-03-06 DIAGNOSIS — Z74.09 IMPAIRED MOBILITY AND ADLS: ICD-10-CM

## 2018-03-06 PROBLEM — I25.10 CORONARY ARTERY DISEASE INVOLVING NATIVE CORONARY ARTERY OF NATIVE HEART WITHOUT ANGINA PECTORIS: Chronic | Status: ACTIVE | Noted: 2018-03-06

## 2018-03-06 PROBLEM — E11.8 TYPE 2 DIABETES MELLITUS WITH COMPLICATION, WITH LONG-TERM CURRENT USE OF INSULIN (HCC): Chronic | Status: ACTIVE | Noted: 2018-03-06

## 2018-03-06 PROBLEM — Z79.4 TYPE 2 DIABETES MELLITUS WITH COMPLICATION, WITH LONG-TERM CURRENT USE OF INSULIN: Chronic | Status: ACTIVE | Noted: 2018-03-06

## 2018-03-06 PROBLEM — I10 HYPERTENSION: Chronic | Status: ACTIVE | Noted: 2018-03-06

## 2018-03-06 LAB
ALBUMIN SERPL-MCNC: 4.5 G/DL (ref 3.2–4.8)
ALBUMIN/GLOB SERPL: 1.7 G/DL (ref 1.5–2.5)
ALP SERPL-CCNC: 126 U/L (ref 25–100)
ALT SERPL W P-5'-P-CCNC: 41 U/L (ref 7–40)
ANION GAP SERPL CALCULATED.3IONS-SCNC: 11 MMOL/L (ref 3–11)
AST SERPL-CCNC: 26 U/L (ref 0–33)
BACTERIA UR QL AUTO: ABNORMAL /HPF
BASOPHILS # BLD AUTO: 0.04 10*3/MM3 (ref 0–0.2)
BASOPHILS NFR BLD AUTO: 0.3 % (ref 0–1)
BILIRUB SERPL-MCNC: 0.5 MG/DL (ref 0.3–1.2)
BILIRUB UR QL STRIP: NEGATIVE
BUN BLD-MCNC: 30 MG/DL (ref 9–23)
BUN/CREAT SERPL: 18.8 (ref 7–25)
CALCIUM SPEC-SCNC: 9.8 MG/DL (ref 8.7–10.4)
CHLORIDE SERPL-SCNC: 102 MMOL/L (ref 99–109)
CLARITY UR: CLEAR
CO2 SERPL-SCNC: 21 MMOL/L (ref 20–31)
COLOR UR: YELLOW
CREAT BLD-MCNC: 1.6 MG/DL (ref 0.6–1.3)
DEPRECATED RDW RBC AUTO: 47.2 FL (ref 37–54)
EOSINOPHIL # BLD AUTO: 0.13 10*3/MM3 (ref 0–0.3)
EOSINOPHIL NFR BLD AUTO: 1.1 % (ref 0–3)
ERYTHROCYTE [DISTWIDTH] IN BLOOD BY AUTOMATED COUNT: 14.4 % (ref 11.3–14.5)
GFR SERPL CREATININE-BSD FRML MDRD: 42 ML/MIN/1.73
GLOBULIN UR ELPH-MCNC: 2.7 GM/DL
GLUCOSE BLD-MCNC: 356 MG/DL (ref 70–100)
GLUCOSE UR STRIP-MCNC: ABNORMAL MG/DL
HCT VFR BLD AUTO: 44.8 % (ref 38.9–50.9)
HGB BLD-MCNC: 15 G/DL (ref 13.1–17.5)
HGB UR QL STRIP.AUTO: ABNORMAL
HOLD SPECIMEN: NORMAL
HOLD SPECIMEN: NORMAL
HYALINE CASTS UR QL AUTO: ABNORMAL /LPF
IMM GRANULOCYTES # BLD: 0.03 10*3/MM3 (ref 0–0.03)
IMM GRANULOCYTES NFR BLD: 0.3 % (ref 0–0.6)
KETONES UR QL STRIP: NEGATIVE
LEUKOCYTE ESTERASE UR QL STRIP.AUTO: NEGATIVE
LYMPHOCYTES # BLD AUTO: 1.55 10*3/MM3 (ref 0.6–4.8)
LYMPHOCYTES NFR BLD AUTO: 13.3 % (ref 24–44)
MCH RBC QN AUTO: 30 PG (ref 27–31)
MCHC RBC AUTO-ENTMCNC: 33.5 G/DL (ref 32–36)
MCV RBC AUTO: 89.6 FL (ref 80–99)
MONOCYTES # BLD AUTO: 0.84 10*3/MM3 (ref 0–1)
MONOCYTES NFR BLD AUTO: 7.2 % (ref 0–12)
NEUTROPHILS # BLD AUTO: 9.08 10*3/MM3 (ref 1.5–8.3)
NEUTROPHILS NFR BLD AUTO: 77.8 % (ref 41–71)
NITRITE UR QL STRIP: NEGATIVE
PH UR STRIP.AUTO: <=5 [PH] (ref 5–8)
PLATELET # BLD AUTO: 274 10*3/MM3 (ref 150–450)
PMV BLD AUTO: 10.2 FL (ref 6–12)
POTASSIUM BLD-SCNC: 4.4 MMOL/L (ref 3.5–5.5)
PROT SERPL-MCNC: 7.2 G/DL (ref 5.7–8.2)
PROT UR QL STRIP: NEGATIVE
RBC # BLD AUTO: 5 10*6/MM3 (ref 4.2–5.76)
RBC # UR: ABNORMAL /HPF
REF LAB TEST METHOD: ABNORMAL
SODIUM BLD-SCNC: 134 MMOL/L (ref 132–146)
SP GR UR STRIP: 1.01 (ref 1–1.03)
SQUAMOUS #/AREA URNS HPF: ABNORMAL /HPF
TROPONIN I SERPL-MCNC: 0.01 NG/ML (ref 0–0.07)
UROBILINOGEN UR QL STRIP: ABNORMAL
WBC NRBC COR # BLD: 11.67 10*3/MM3 (ref 3.5–10.8)
WBC UR QL AUTO: ABNORMAL /HPF
WHOLE BLOOD HOLD SPECIMEN: NORMAL
WHOLE BLOOD HOLD SPECIMEN: NORMAL
YEAST URNS QL MICRO: ABNORMAL /HPF

## 2018-03-06 PROCEDURE — 80053 COMPREHEN METABOLIC PANEL: CPT | Performed by: EMERGENCY MEDICINE

## 2018-03-06 PROCEDURE — 71045 X-RAY EXAM CHEST 1 VIEW: CPT

## 2018-03-06 PROCEDURE — 87086 URINE CULTURE/COLONY COUNT: CPT | Performed by: EMERGENCY MEDICINE

## 2018-03-06 PROCEDURE — 93005 ELECTROCARDIOGRAM TRACING: CPT | Performed by: EMERGENCY MEDICINE

## 2018-03-06 PROCEDURE — 70551 MRI BRAIN STEM W/O DYE: CPT

## 2018-03-06 PROCEDURE — 99222 1ST HOSP IP/OBS MODERATE 55: CPT | Performed by: FAMILY MEDICINE

## 2018-03-06 PROCEDURE — 85025 COMPLETE CBC W/AUTO DIFF WBC: CPT | Performed by: EMERGENCY MEDICINE

## 2018-03-06 PROCEDURE — 81001 URINALYSIS AUTO W/SCOPE: CPT | Performed by: EMERGENCY MEDICINE

## 2018-03-06 PROCEDURE — 84484 ASSAY OF TROPONIN QUANT: CPT

## 2018-03-06 PROCEDURE — 99285 EMERGENCY DEPT VISIT HI MDM: CPT

## 2018-03-06 RX ORDER — SODIUM CHLORIDE 0.9 % (FLUSH) 0.9 %
10 SYRINGE (ML) INJECTION AS NEEDED
Status: DISCONTINUED | OUTPATIENT
Start: 2018-03-06 | End: 2018-03-06

## 2018-03-06 NOTE — ED PROVIDER NOTES
"Subjective   HPI Comments: Papo Ann is a 77 year old white male presenting with altered mental status X3 days. The patient is a poor historian and his wife is at bedside. On 3/4/18 the wife noticed that the patient was \"overall slow\" regarding his movement, thought processes, and speech. Over the next few days, the wife noticed the patient was dragging his right foot, had difficulty with handwriting, presence of word finding and new onset generalized tremor. There is increased voice hoarseness accompanied by intermittent slurred speech. The patient does state that he fell 2 days ago down stairs with impact on his right wrist and right foot. There are no complaints of right wrist pain or right foot pain or decreased ROM from the fall at this time.     There are no additional complaints.        Patient is a 77 y.o. male presenting with altered mental status.   History provided by:  Patient and spouse  History limited by:  Mental status change  Altered Mental Status   Presenting symptoms: lethargy and memory loss    Severity:  Moderate  Most recent episode:  Today  Episode history:  Continuous  Duration:  3 days  Timing:  Constant  Progression:  Unchanged  Chronicity:  New  Context: not alcohol use, not dementia, not drug use, not head injury, not nursing home resident, not recent change in medication, not recent illness and not recent infection    Associated symptoms: abnormal movement (Tremors and right leg weakness ), slurred speech and weakness    Associated symptoms: no abdominal pain, no difficulty breathing, no fever, no headaches, no light-headedness, no nausea, no palpitations and no visual change        Review of Systems   Constitutional: Positive for activity change (Decreased ). Negative for appetite change, diaphoresis, fatigue and fever.   HENT: Positive for voice change (Intermittent hoarseness and slurred ).    Eyes: Negative for photophobia and visual disturbance.   Respiratory: Negative for chest " tightness, shortness of breath and wheezing.    Cardiovascular: Negative for chest pain, palpitations and leg swelling.   Gastrointestinal: Negative for abdominal pain, blood in stool and nausea.   Genitourinary: Positive for dysuria (Intermittent and baseline ) and hematuria. Genital sores: Baseline for patient    Musculoskeletal: Positive for gait problem (Right leg weakness ).   Neurological: Positive for speech difficulty (Word findings ) and weakness. Negative for dizziness, syncope, facial asymmetry, light-headedness, numbness and headaches.   Psychiatric/Behavioral: Positive for decreased concentration and memory loss.   All other systems reviewed and are negative.      Past Medical History:   Diagnosis Date   • Chronic kidney disease 2005    renal failure no dialysis   • Diverticula of colon 1973   • Hypertension    • Kidney stone 04/12/2015   • Pneumothorax, spontaneous, tension 1970   • Prostate cancer    • Skin cancer 2009    Dr. Cisco Prater   • Ulcer duodenal hemorrhage 1973       Allergies   Allergen Reactions   • Ceclor [Cefaclor] Hives   • Codeine Hives   • Erythromycin Hives   • Flomax [Tamsulosin] Anaphylaxis   • Keflex [Cephalexin] Hives   • Other Hives     Potaba caps 500mg   • Penicillins Hives   • Sulfa Antibiotics Hives   • Adhesive Tape Other (See Comments)     blister   • Glucophage [Metformin] Diarrhea   • Hctz [Hydrochlorothiazide] Hives   • Lisinopril Cough       Past Surgical History:   Procedure Laterality Date   • COLONOSCOPY  2014    Dr. Chairez   • CORONARY STENT PLACEMENT  10/21/2003    Dr. Izaguirre   • CORONARY STENT PLACEMENT  01/2018    Stent to LAD   • ESOPHAGEAL DILATATION  2012    Dr. Weinstein polyps removed also   • HAND SURGERY Left 2008    Dr. Marty Sommers   • HAND SURGERY Right 2010    Dr. Lai   • HEMORRHOIDECTOMY  1976   • PROSTATE BIOPSY  03/03/2017    [facility]   • PROSTATE FIDUCIAL MARKER PLACEMENT  06/12/2017   • PROSTATE SURGERY  1999    turp Dr. Hampton       Family  History   Problem Relation Age of Onset   • Heart disease Mother    • Lung cancer Mother    • Alzheimer's disease Mother    • Heart failure Father    • Prostate cancer Son        Social History     Social History   • Marital status: Unknown     Social History Main Topics   • Smoking status: Former Smoker     Packs/day: 3.00     Quit date: 1982   • Smokeless tobacco: Never Used   • Alcohol use Yes      Comment: twice per year   • Drug use: No   • Sexual activity: Defer         Objective   Physical Exam   Constitutional: He is oriented to person, place, and time. He appears well-developed and well-nourished. No distress.   HENT:   Head: Normocephalic and atraumatic.   Airway patent.   Eyes: EOM are normal. Pupils are equal, round, and reactive to light.   Neck: Normal range of motion. Neck supple. No JVD present.   Cardiovascular: Normal rate, regular rhythm, normal heart sounds and intact distal pulses.  Exam reveals no gallop and no friction rub.    No murmur heard.  Pulmonary/Chest: Effort normal. No respiratory distress. He has no wheezes. He has rales in the right upper field and the right lower field. He exhibits no tenderness.   Abdominal: Soft. Bowel sounds are normal. He exhibits no distension. There is no tenderness. There is no rebound and no guarding.   Musculoskeletal: Normal range of motion.   Lymphadenopathy:     He has no cervical adenopathy.   Neurological: He is alert and oriented to person, place, and time. He displays tremor (Generalized tremor at rest ). No sensory deficit. GCS eye subscore is 4. GCS verbal subscore is 5. GCS motor subscore is 6. He displays no Babinski's sign on the right side. He displays no Babinski's sign on the left side.   Reflex Scores:       Patellar reflexes are 2+ on the right side and 2+ on the left side.       Achilles reflexes are 1+ on the right side and 1+ on the left side.  Skin: Skin is warm and dry. He is not diaphoretic.   Psychiatric: He has a normal mood and  affect. His behavior is normal. His speech is delayed.   Nursing note and vitals reviewed.      Procedures         ED Course  ED Course   Comment By Time   Dr. Montana at bedside with the patient. [facility] 03/06 1657   Dr. Montana re-evaluated the patient and discussed all findings. Navdeep [facility] 03/06 1912     Recent Results (from the past 24 hour(s))   Comprehensive Metabolic Panel    Collection Time: 03/06/18  4:06 PM   Result Value Ref Range    Glucose 356 (H) 70 - 100 mg/dL    BUN 30 (H) 9 - 23 mg/dL    Creatinine 1.60 (H) 0.60 - 1.30 mg/dL    Sodium 134 132 - 146 mmol/L    Potassium 4.4 3.5 - 5.5 mmol/L    Chloride 102 99 - 109 mmol/L    CO2 21.0 20.0 - 31.0 mmol/L    Calcium 9.8 8.7 - 10.4 mg/dL    Total Protein 7.2 5.7 - 8.2 g/dL    Albumin 4.50 3.20 - 4.80 g/dL    ALT (SGPT) 41 (H) 7 - 40 U/L    AST (SGOT) 26 0 - 33 U/L    Alkaline Phosphatase 126 (H) 25 - 100 U/L    Total Bilirubin 0.5 0.3 - 1.2 mg/dL    eGFR Non African Amer 42 (L) >60 mL/min/1.73    Globulin 2.7 gm/dL    A/G Ratio 1.7 1.5 - 2.5 g/dL    BUN/Creatinine Ratio 18.8 7.0 - 25.0    Anion Gap 11.0 3.0 - 11.0 mmol/L   Light Blue Top    Collection Time: 03/06/18  4:06 PM   Result Value Ref Range    Extra Tube hold for add-on    Green Top (Gel)    Collection Time: 03/06/18  4:06 PM   Result Value Ref Range    Extra Tube Hold for add-ons.    Lavender Top    Collection Time: 03/06/18  4:06 PM   Result Value Ref Range    Extra Tube hold for add-on    Gold Top - SST    Collection Time: 03/06/18  4:06 PM   Result Value Ref Range    Extra Tube Hold for add-ons.    CBC Auto Differential    Collection Time: 03/06/18  4:06 PM   Result Value Ref Range    WBC 11.67 (H) 3.50 - 10.80 10*3/mm3    RBC 5.00 4.20 - 5.76 10*6/mm3    Hemoglobin 15.0 13.1 - 17.5 g/dL    Hematocrit 44.8 38.9 - 50.9 %    MCV 89.6 80.0 - 99.0 fL    MCH 30.0 27.0 - 31.0 pg    MCHC 33.5 32.0 - 36.0 g/dL    RDW 14.4 11.3 - 14.5 %    RDW-SD 47.2 37.0 - 54.0 fl    MPV 10.2 6.0 -  12.0 fL    Platelets 274 150 - 450 10*3/mm3    Neutrophil % 77.8 (H) 41.0 - 71.0 %    Lymphocyte % 13.3 (L) 24.0 - 44.0 %    Monocyte % 7.2 0.0 - 12.0 %    Eosinophil % 1.1 0.0 - 3.0 %    Basophil % 0.3 0.0 - 1.0 %    Immature Grans % 0.3 0.0 - 0.6 %    Neutrophils, Absolute 9.08 (H) 1.50 - 8.30 10*3/mm3    Lymphocytes, Absolute 1.55 0.60 - 4.80 10*3/mm3    Monocytes, Absolute 0.84 0.00 - 1.00 10*3/mm3    Eosinophils, Absolute 0.13 0.00 - 0.30 10*3/mm3    Basophils, Absolute 0.04 0.00 - 0.20 10*3/mm3    Immature Grans, Absolute 0.03 0.00 - 0.03 10*3/mm3   Urinalysis With / Culture If Indicated - Urine, Clean Catch    Collection Time: 03/06/18  5:14 PM   Result Value Ref Range    Color, UA Yellow Yellow, Straw    Appearance, UA Clear Clear    pH, UA <=5.0 5.0 - 8.0    Specific Gravity, UA 1.011 1.001 - 1.030    Glucose, UA >=1000 mg/dL (3+) (A) Negative    Ketones, UA Negative Negative    Bilirubin, UA Negative Negative    Blood, UA Large (3+) (A) Negative    Protein, UA Negative Negative    Leuk Esterase, UA Negative Negative    Nitrite, UA Negative Negative    Urobilinogen, UA 0.2 E.U./dL 0.2 - 1.0 E.U./dL   Urinalysis, Microscopic Only - Urine, Clean Catch    Collection Time: 03/06/18  5:14 PM   Result Value Ref Range    RBC, UA 7-12 (A) None Seen, 0-2 /HPF    WBC, UA 0-2 None Seen, 0-2 /HPF    Bacteria, UA None Seen None Seen, Trace /HPF    Squamous Epithelial Cells, UA 0-2 None Seen, 0-2 /HPF    Yeast, UA Small/1+ Yeast None Seen /HPF    Hyaline Casts, UA 0-6 0 - 6 /LPF    Methodology Manual Light Microscopy    POC Troponin, Rapid    Collection Time: 03/06/18  6:35 PM   Result Value Ref Range    Troponin I 0.01 0.00 - 0.07 ng/mL     Note: In addition to lab results from this visit, the labs listed above may include labs taken at another facility or during a different encounter within the last 24 hours. Please correlate lab times with ED admission and discharge times for further clarification of the services  performed during this visit.    MRI Brain Without Contrast   Final Result   Addendum 1 of 1   THIS REPORT CONTAINS FINDINGS THAT MAY BE CRITICAL TO PATIENT CARE. The    findings were verbally communicated via telephone conference with ZBIGNIEW MONTANA    at 7:11 PM EST on 3/6/2018. The findings were acknowledged and understood.      THIS DOCUMENT HAS BEEN ELECTRONICALLY SIGNED BY JEMMA PERALES MD      Final      Acute to subacute left pontine infarction      Cerebral atrophy, chronic small vessel ischemic disease.      THIS DOCUMENT HAS BEEN ELECTRONICALLY SIGNED BY JEMMA PERALES MD        Vitals:    03/06/18 1900 03/06/18 1930 03/06/18 1932 03/06/18 2000   BP: 174/85 145/90  164/85   BP Location:       Patient Position:       Pulse: 70  67 64   Resp:       Temp:       TempSrc:       SpO2: 95%  95% 94%   Weight:       Height:         Medications - No data to display  ECG/EMG Results (last 24 hours)     ** No results found for the last 24 hours. **                    [facility]    Final diagnoses:   Cerebrovascular accident (CVA), unspecified mechanism       Documentation assistance provided by mikayla Meraz.  Information recorded by the scribe was done at my direction and has been verified and validated by me.     Navdeep Meraz  03/06/18 1655       Navdeep Meraz  03/06/18 1701       Zbigniew Montana MD  03/06/18 2040

## 2018-03-07 ENCOUNTER — APPOINTMENT (OUTPATIENT)
Dept: CARDIOLOGY | Facility: HOSPITAL | Age: 78
End: 2018-03-07

## 2018-03-07 VITALS
TEMPERATURE: 98.1 F | HEIGHT: 67 IN | RESPIRATION RATE: 20 BRPM | OXYGEN SATURATION: 93 % | HEART RATE: 87 BPM | SYSTOLIC BLOOD PRESSURE: 168 MMHG | WEIGHT: 197 LBS | DIASTOLIC BLOOD PRESSURE: 90 MMHG | BODY MASS INDEX: 30.92 KG/M2

## 2018-03-07 LAB
ANION GAP SERPL CALCULATED.3IONS-SCNC: 5 MMOL/L (ref 3–11)
ARTICHOKE IGE QN: 95 MG/DL (ref 0–130)
BASOPHILS # BLD AUTO: 0.05 10*3/MM3 (ref 0–0.2)
BASOPHILS NFR BLD AUTO: 0.5 % (ref 0–1)
BH CV ECHO MEAS - AO ROOT AREA (BSA CORRECTED): 1.6
BH CV ECHO MEAS - AO ROOT AREA: 8.5 CM^2
BH CV ECHO MEAS - AO ROOT DIAM: 3.3 CM
BH CV ECHO MEAS - BSA(HAYCOCK): 2.1 M^2
BH CV ECHO MEAS - BSA: 2 M^2
BH CV ECHO MEAS - BZI_BMI: 30.9 KILOGRAMS/M^2
BH CV ECHO MEAS - BZI_METRIC_HEIGHT: 170.2 CM
BH CV ECHO MEAS - BZI_METRIC_WEIGHT: 89.4 KG
BH CV ECHO MEAS - CONTRAST EF (2CH): 69.8 ML/M^2
BH CV ECHO MEAS - CONTRAST EF 4CH: 66.7 ML/M^2
BH CV ECHO MEAS - EDV(CUBED): 47.3 ML
BH CV ECHO MEAS - EDV(MOD-SP2): 63 ML
BH CV ECHO MEAS - EDV(MOD-SP4): 87 ML
BH CV ECHO MEAS - EDV(TEICH): 55.1 ML
BH CV ECHO MEAS - EF(CUBED): 63.2 %
BH CV ECHO MEAS - EF(MOD-SP2): 69.8 %
BH CV ECHO MEAS - EF(MOD-SP4): 66.7 %
BH CV ECHO MEAS - EF(TEICH): 55.7 %
BH CV ECHO MEAS - ESV(CUBED): 17.4 ML
BH CV ECHO MEAS - ESV(MOD-SP2): 19 ML
BH CV ECHO MEAS - ESV(MOD-SP4): 29 ML
BH CV ECHO MEAS - ESV(TEICH): 24.4 ML
BH CV ECHO MEAS - FS: 28.4 %
BH CV ECHO MEAS - IVS/LVPW: 1.1
BH CV ECHO MEAS - IVSD: 1.3 CM
BH CV ECHO MEAS - LA DIMENSION: 4.1 CM
BH CV ECHO MEAS - LA/AO: 1.3
BH CV ECHO MEAS - LAT PEAK E' VEL: 10.3 CM/SEC
BH CV ECHO MEAS - LV DIASTOLIC VOL/BSA (35-75): 43.3 ML/M^2
BH CV ECHO MEAS - LV MASS(C)D: 146.8 GRAMS
BH CV ECHO MEAS - LV MASS(C)DI: 73.1 GRAMS/M^2
BH CV ECHO MEAS - LV MAX PG: 6.2 MMHG
BH CV ECHO MEAS - LV MEAN PG: 2.7 MMHG
BH CV ECHO MEAS - LV SYSTOLIC VOL/BSA (12-30): 14.4 ML/M^2
BH CV ECHO MEAS - LV V1 MAX: 124.4 CM/SEC
BH CV ECHO MEAS - LV V1 MEAN: 77.3 CM/SEC
BH CV ECHO MEAS - LV V1 VTI: 28.6 CM
BH CV ECHO MEAS - LVIDD: 3.6 CM
BH CV ECHO MEAS - LVIDS: 2.6 CM
BH CV ECHO MEAS - LVLD AP2: 8.7 CM
BH CV ECHO MEAS - LVLD AP4: 8.4 CM
BH CV ECHO MEAS - LVLS AP2: 6.1 CM
BH CV ECHO MEAS - LVLS AP4: 7 CM
BH CV ECHO MEAS - LVOT AREA (M): 4.2 CM^2
BH CV ECHO MEAS - LVOT AREA: 4 CM^2
BH CV ECHO MEAS - LVOT DIAM: 2.3 CM
BH CV ECHO MEAS - LVPWD: 1.1 CM
BH CV ECHO MEAS - MED PEAK E' VEL: 5.46 CM/SEC
BH CV ECHO MEAS - MV A MAX VEL: 112.5 CM/SEC
BH CV ECHO MEAS - MV DEC SLOPE: 191.1 CM/SEC^2
BH CV ECHO MEAS - MV E MAX VEL: 64.2 CM/SEC
BH CV ECHO MEAS - MV E/A: 0.57
BH CV ECHO MEAS - MV P1/2T MAX VEL: 67.1 CM/SEC
BH CV ECHO MEAS - MV P1/2T: 102.9 MSEC
BH CV ECHO MEAS - MVA P1/2T LCG: 3.3 CM^2
BH CV ECHO MEAS - MVA(P1/2T): 2.1 CM^2
BH CV ECHO MEAS - RVDD: 2.7 CM
BH CV ECHO MEAS - SI(CUBED): 14.9 ML/M^2
BH CV ECHO MEAS - SI(LVOT): 57.6 ML/M^2
BH CV ECHO MEAS - SI(MOD-SP2): 21.9 ML/M^2
BH CV ECHO MEAS - SI(MOD-SP4): 28.9 ML/M^2
BH CV ECHO MEAS - SI(TEICH): 15.3 ML/M^2
BH CV ECHO MEAS - SV(CUBED): 29.9 ML
BH CV ECHO MEAS - SV(LVOT): 115.6 ML
BH CV ECHO MEAS - SV(MOD-SP2): 44 ML
BH CV ECHO MEAS - SV(MOD-SP4): 58 ML
BH CV ECHO MEAS - SV(TEICH): 30.7 ML
BH CV ECHO MEAS - TAPSE (>1.6): 1.2 CM2
BH CV VAS BP RIGHT ARM: NORMAL MMHG
BH CV XLRA - RV BASE: 3.3 CM
BH CV XLRA - RV LENGTH: 6.8 CM
BH CV XLRA - RV MID: 2.3 CM
BH CV XLRA - TDI S': 15.8 CM/SEC
BH CV XLRA MEAS LEFT CCA RATIO VEL: 108 CM/SEC
BH CV XLRA MEAS LEFT DIST CCA EDV: 21.2 CM/SEC
BH CV XLRA MEAS LEFT DIST CCA PSV: 95.6 CM/SEC
BH CV XLRA MEAS LEFT DIST ICA EDV: 34.6 CM/SEC
BH CV XLRA MEAS LEFT DIST ICA PSV: 107.1 CM/SEC
BH CV XLRA MEAS LEFT ICA RATIO VEL: 81.6 CM/SEC
BH CV XLRA MEAS LEFT ICA/CCA RATIO: 0.76
BH CV XLRA MEAS LEFT MID CCA EDV: 22.8 CM/SEC
BH CV XLRA MEAS LEFT MID CCA PSV: 108.5 CM/SEC
BH CV XLRA MEAS LEFT MID ICA EDV: 20.9 CM/SEC
BH CV XLRA MEAS LEFT MID ICA PSV: 82 CM/SEC
BH CV XLRA MEAS LEFT PROX CCA EDV: 21.2 CM/SEC
BH CV XLRA MEAS LEFT PROX CCA PSV: 115.3 CM/SEC
BH CV XLRA MEAS LEFT PROX ECA PSV: 92.6 CM/SEC
BH CV XLRA MEAS LEFT PROX ICA EDV: 20 CM/SEC
BH CV XLRA MEAS LEFT PROX ICA PSV: 63.8 CM/SEC
BH CV XLRA MEAS LEFT PROX SCLA PSV: 330 CM/SEC
BH CV XLRA MEAS LEFT VERTEBRAL A PSV: 57.9 CM/SEC
BH CV XLRA MEAS RIGHT CCA RATIO VEL: 97.7 CM/SEC
BH CV XLRA MEAS RIGHT DIST CCA EDV: 14.8 CM/SEC
BH CV XLRA MEAS RIGHT DIST CCA PSV: 73.8 CM/SEC
BH CV XLRA MEAS RIGHT DIST ICA EDV: 25.5 CM/SEC
BH CV XLRA MEAS RIGHT DIST ICA PSV: 117.9 CM/SEC
BH CV XLRA MEAS RIGHT ICA RATIO VEL: 82.4 CM/SEC
BH CV XLRA MEAS RIGHT ICA/CCA RATIO: 0.84
BH CV XLRA MEAS RIGHT MID CCA EDV: 26.6 CM/SEC
BH CV XLRA MEAS RIGHT MID CCA PSV: 98.6 CM/SEC
BH CV XLRA MEAS RIGHT MID ICA EDV: 25.5 CM/SEC
BH CV XLRA MEAS RIGHT MID ICA PSV: 83 CM/SEC
BH CV XLRA MEAS RIGHT PROX CCA EDV: 22.8 CM/SEC
BH CV XLRA MEAS RIGHT PROX CCA PSV: 102.4 CM/SEC
BH CV XLRA MEAS RIGHT PROX ECA PSV: 144.1 CM/SEC
BH CV XLRA MEAS RIGHT PROX ICA EDV: 16 CM/SEC
BH CV XLRA MEAS RIGHT PROX ICA PSV: 73.5 CM/SEC
BH CV XLRA MEAS RIGHT PROX SCLA PSV: 255.7 CM/SEC
BH CV XLRA MEAS RIGHT VERTEBRAL A PSV: 43.4 CM/SEC
BUN BLD-MCNC: 22 MG/DL (ref 9–23)
BUN/CREAT SERPL: 18.3 (ref 7–25)
CALCIUM SPEC-SCNC: 9.7 MG/DL (ref 8.7–10.4)
CHLORIDE SERPL-SCNC: 112 MMOL/L (ref 99–109)
CHOLEST SERPL-MCNC: 134 MG/DL (ref 0–200)
CO2 SERPL-SCNC: 24 MMOL/L (ref 20–31)
CREAT BLD-MCNC: 1.2 MG/DL (ref 0.6–1.3)
DEPRECATED RDW RBC AUTO: 47.6 FL (ref 37–54)
E/E' RATIO: 10
EOSINOPHIL # BLD AUTO: 0.34 10*3/MM3 (ref 0–0.3)
EOSINOPHIL NFR BLD AUTO: 3.6 % (ref 0–3)
ERYTHROCYTE [DISTWIDTH] IN BLOOD BY AUTOMATED COUNT: 14.6 % (ref 11.3–14.5)
GFR SERPL CREATININE-BSD FRML MDRD: 59 ML/MIN/1.73
GLUCOSE BLD-MCNC: 111 MG/DL (ref 70–100)
GLUCOSE BLDC GLUCOMTR-MCNC: 105 MG/DL (ref 70–130)
GLUCOSE BLDC GLUCOMTR-MCNC: 130 MG/DL (ref 70–130)
GLUCOSE BLDC GLUCOMTR-MCNC: 238 MG/DL (ref 70–130)
HBA1C MFR BLD: 8.1 % (ref 4.8–5.6)
HCT VFR BLD AUTO: 42.8 % (ref 38.9–50.9)
HDLC SERPL-MCNC: 31 MG/DL (ref 40–60)
HGB BLD-MCNC: 14.3 G/DL (ref 13.1–17.5)
IMM GRANULOCYTES # BLD: 0.02 10*3/MM3 (ref 0–0.03)
IMM GRANULOCYTES NFR BLD: 0.2 % (ref 0–0.6)
LV EF 2D ECHO EST: 70 %
LYMPHOCYTES # BLD AUTO: 2.27 10*3/MM3 (ref 0.6–4.8)
LYMPHOCYTES NFR BLD AUTO: 24.3 % (ref 24–44)
MAXIMAL PREDICTED HEART RATE: 143 BPM
MCH RBC QN AUTO: 29.9 PG (ref 27–31)
MCHC RBC AUTO-ENTMCNC: 33.4 G/DL (ref 32–36)
MCV RBC AUTO: 89.5 FL (ref 80–99)
MONOCYTES # BLD AUTO: 0.96 10*3/MM3 (ref 0–1)
MONOCYTES NFR BLD AUTO: 10.3 % (ref 0–12)
NEUTROPHILS # BLD AUTO: 5.7 10*3/MM3 (ref 1.5–8.3)
NEUTROPHILS NFR BLD AUTO: 61.1 % (ref 41–71)
PA ADP PRP-ACNC: 135 PRU
PLATELET # BLD AUTO: 273 10*3/MM3 (ref 150–450)
PMV BLD AUTO: 10.5 FL (ref 6–12)
POTASSIUM BLD-SCNC: 4.1 MMOL/L (ref 3.5–5.5)
RBC # BLD AUTO: 4.78 10*6/MM3 (ref 4.2–5.76)
SODIUM BLD-SCNC: 141 MMOL/L (ref 132–146)
STRESS TARGET HR: 122 BPM
TRIGL SERPL-MCNC: 134 MG/DL (ref 0–150)
TSH SERPL DL<=0.05 MIU/L-ACNC: 3.99 MIU/ML (ref 0.35–5.35)
WBC NRBC COR # BLD: 9.34 10*3/MM3 (ref 3.5–10.8)

## 2018-03-07 PROCEDURE — 82962 GLUCOSE BLOOD TEST: CPT

## 2018-03-07 PROCEDURE — 99222 1ST HOSP IP/OBS MODERATE 55: CPT | Performed by: PSYCHIATRY & NEUROLOGY

## 2018-03-07 PROCEDURE — 80048 BASIC METABOLIC PNL TOTAL CA: CPT | Performed by: NURSE PRACTITIONER

## 2018-03-07 PROCEDURE — 97161 PT EVAL LOW COMPLEX 20 MIN: CPT

## 2018-03-07 PROCEDURE — 92610 EVALUATE SWALLOWING FUNCTION: CPT

## 2018-03-07 PROCEDURE — 99239 HOSP IP/OBS DSCHRG MGMT >30: CPT | Performed by: INTERNAL MEDICINE

## 2018-03-07 PROCEDURE — 63710000001 INSULIN LISPRO (HUMAN) PER 5 UNITS: Performed by: NURSE PRACTITIONER

## 2018-03-07 PROCEDURE — 93306 TTE W/DOPPLER COMPLETE: CPT

## 2018-03-07 PROCEDURE — 85576 BLOOD PLATELET AGGREGATION: CPT | Performed by: NURSE PRACTITIONER

## 2018-03-07 PROCEDURE — 85025 COMPLETE CBC W/AUTO DIFF WBC: CPT | Performed by: NURSE PRACTITIONER

## 2018-03-07 PROCEDURE — 80061 LIPID PANEL: CPT | Performed by: NURSE PRACTITIONER

## 2018-03-07 PROCEDURE — 97165 OT EVAL LOW COMPLEX 30 MIN: CPT

## 2018-03-07 PROCEDURE — 83036 HEMOGLOBIN GLYCOSYLATED A1C: CPT | Performed by: NURSE PRACTITIONER

## 2018-03-07 PROCEDURE — 93880 EXTRACRANIAL BILAT STUDY: CPT | Performed by: INTERNAL MEDICINE

## 2018-03-07 PROCEDURE — 93880 EXTRACRANIAL BILAT STUDY: CPT

## 2018-03-07 PROCEDURE — G0108 DIAB MANAGE TRN  PER INDIV: HCPCS | Performed by: REGISTERED NURSE

## 2018-03-07 PROCEDURE — 93306 TTE W/DOPPLER COMPLETE: CPT | Performed by: INTERNAL MEDICINE

## 2018-03-07 PROCEDURE — 97116 GAIT TRAINING THERAPY: CPT

## 2018-03-07 PROCEDURE — 92523 SPEECH SOUND LANG COMPREHEN: CPT

## 2018-03-07 PROCEDURE — 84443 ASSAY THYROID STIM HORMONE: CPT | Performed by: NURSE PRACTITIONER

## 2018-03-07 RX ORDER — CLOPIDOGREL BISULFATE 75 MG/1
75 TABLET ORAL DAILY
Status: DISCONTINUED | OUTPATIENT
Start: 2018-03-07 | End: 2018-03-07 | Stop reason: HOSPADM

## 2018-03-07 RX ORDER — ATORVASTATIN CALCIUM 80 MG/1
80 TABLET, FILM COATED ORAL NIGHTLY
Qty: 30 TABLET | Refills: 0 | Status: SHIPPED | OUTPATIENT
Start: 2018-03-07

## 2018-03-07 RX ORDER — PANTOPRAZOLE SODIUM 40 MG/1
40 TABLET, DELAYED RELEASE ORAL
Status: DISCONTINUED | OUTPATIENT
Start: 2018-03-07 | End: 2018-03-07 | Stop reason: HOSPADM

## 2018-03-07 RX ORDER — NICOTINE POLACRILEX 4 MG
15 LOZENGE BUCCAL
Status: DISCONTINUED | OUTPATIENT
Start: 2018-03-07 | End: 2018-03-07 | Stop reason: HOSPADM

## 2018-03-07 RX ORDER — MELATONIN
2000 DAILY
Status: DISCONTINUED | OUTPATIENT
Start: 2018-03-07 | End: 2018-03-07 | Stop reason: HOSPADM

## 2018-03-07 RX ORDER — DEXTROSE MONOHYDRATE 25 G/50ML
25 INJECTION, SOLUTION INTRAVENOUS
Status: DISCONTINUED | OUTPATIENT
Start: 2018-03-07 | End: 2018-03-07 | Stop reason: HOSPADM

## 2018-03-07 RX ORDER — ACETAMINOPHEN 325 MG/1
650 TABLET ORAL EVERY 4 HOURS PRN
Status: DISCONTINUED | OUTPATIENT
Start: 2018-03-07 | End: 2018-03-07 | Stop reason: HOSPADM

## 2018-03-07 RX ORDER — ACETAMINOPHEN 650 MG/1
650 SUPPOSITORY RECTAL EVERY 4 HOURS PRN
Status: DISCONTINUED | OUTPATIENT
Start: 2018-03-07 | End: 2018-03-07 | Stop reason: HOSPADM

## 2018-03-07 RX ORDER — ASPIRIN 300 MG/1
300 SUPPOSITORY RECTAL DAILY
Status: DISCONTINUED | OUTPATIENT
Start: 2018-03-07 | End: 2018-03-07 | Stop reason: HOSPADM

## 2018-03-07 RX ORDER — ASPIRIN 325 MG
325 TABLET ORAL DAILY
Status: DISCONTINUED | OUTPATIENT
Start: 2018-03-07 | End: 2018-03-07 | Stop reason: HOSPADM

## 2018-03-07 RX ORDER — FUROSEMIDE 40 MG/1
40 TABLET ORAL DAILY
Status: DISCONTINUED | OUTPATIENT
Start: 2018-03-07 | End: 2018-03-07 | Stop reason: HOSPADM

## 2018-03-07 RX ORDER — ATORVASTATIN CALCIUM 40 MG/1
80 TABLET, FILM COATED ORAL NIGHTLY
Status: DISCONTINUED | OUTPATIENT
Start: 2018-03-07 | End: 2018-03-07 | Stop reason: HOSPADM

## 2018-03-07 RX ORDER — SODIUM CHLORIDE 0.9 % (FLUSH) 0.9 %
1-10 SYRINGE (ML) INJECTION AS NEEDED
Status: DISCONTINUED | OUTPATIENT
Start: 2018-03-07 | End: 2018-03-07 | Stop reason: HOSPADM

## 2018-03-07 RX ADMIN — ASPIRIN 325 MG ORAL TABLET 325 MG: 325 PILL ORAL at 11:40

## 2018-03-07 RX ADMIN — PANTOPRAZOLE SODIUM 40 MG: 40 TABLET, DELAYED RELEASE ORAL at 11:41

## 2018-03-07 RX ADMIN — VITAMIN D, TAB 1000IU (100/BT) 2000 UNITS: 25 TAB at 11:39

## 2018-03-07 RX ADMIN — INSULIN LISPRO 3 UNITS: 100 INJECTION, SOLUTION INTRAVENOUS; SUBCUTANEOUS at 12:30

## 2018-03-07 RX ADMIN — FUROSEMIDE 40 MG: 40 TABLET ORAL at 11:40

## 2018-03-07 RX ADMIN — CLOPIDOGREL BISULFATE 75 MG: 75 TABLET ORAL at 11:41

## 2018-03-07 NOTE — DISCHARGE SUMMARY
[facility] Medicine Services  DISCHARGE SUMMARY    Patient Name: Papo Ann  : 1940  MRN: [id number]    Date of Admission: 3/6/2018  Date of Discharge:  3/7/2018  Primary Care Physician: Juan Manuel Chung MD    Consults     Date and Time Order Name Status Description    3/7/2018 0222 Inpatient Consult to Neurology          Hospital Course     Presenting Problem:   Cerebrovascular accident (CVA), unspecified mechanism [I63.9]    Active Hospital Problems (** Indicates Principal Problem)    Diagnosis Date Noted   • Hypertension [I10] 2018   • Coronary artery disease involving native coronary artery of native heart without angina pectoris [I25.10] 2018   • Cerebrovascular accident (CVA) [I63.9] 2018   • Type 2 diabetes mellitus with complication, with long-term current use of insulin [E11.8, Z79.4] 2018   • Chronic kidney disease [N18.9] 2005      Resolved Hospital Problems    Diagnosis Date Noted Date Resolved   No resolved problems to display.          Hospital Course:  Papo Ann is a 77 y.o. male who presented from home with facial droop and dysarthria found to have left pontine CVA.    Acute left pontine CVA: 76 y/o male presenting from home with facial droop and dysarthria found to have left pontine CVA confirmed by MRI upon arrival. Echo and carotid imaging was negative. Mechanism of his CVA is likely due to small vessel disease from longstanding HTN and IDDM. Patient was maintained on asa and plavix. His lipitor was increased to 80 mg QHS. Neurology evaluated the patient prior to d/c who recommended continuation of his DAPT and increased diabetes control. I discussed the case with them prior to d/c. He will follow up with his endocrinologist in 2-4 weeks and with CVA clinic in 1 month.          Day of Discharge     HPI: Up in chair. Still a little weak on right side but doing much better.    Review of Systems  Gen- No fevers, chills  CV- No  chest pain, palpitations  Resp- No cough, dyspnea  GI- No N/V/D, abd pain    Otherwise ROS is negative except as mentioned in the HPI.    Vital Signs:   Temp:  [97.5 °F (36.4 °C)-98.1 °F (36.7 °C)] 98.1 °F (36.7 °C)  Heart Rate:  [54-87] 87  Resp:  [18-22] 20  BP: (119-194)/(56-93) 168/90     Physical Exam:  Constitutional: No acute distress, awake, alert, up in chair  HENT: NCAT, mucous membranes moist  Respiratory: Clear to auscultation bilaterally, respiratory effort normal   Cardiovascular: RRR, no murmurs, rubs, or gallops, palpable pedal pulses bilaterally  Gastrointestinal: Positive bowel sounds, soft, nontender, nondistended  Musculoskeletal: No bilateral ankle edema  Psychiatric: Appropriate affect, cooperative  Neurologic: Oriented x 3, CN II-XII intact with mild dysarthria, 4/5 right leg strength otherwise 5/5 strength.  Skin: No rashes    Pertinent  and/or Most Recent Results       Results from last 7 days  Lab Units 03/07/18  0532 03/06/18  1606   WBC 10*3/mm3 9.34 11.67*   HEMOGLOBIN g/dL 14.3 15.0   HEMATOCRIT % 42.8 44.8   PLATELETS 10*3/mm3 273 274   SODIUM mmol/L 141 134   POTASSIUM mmol/L 4.1 4.4   CHLORIDE mmol/L 112* 102   CO2 mmol/L 24.0 21.0   BUN mg/dL 22 30*   CREATININE mg/dL 1.20 1.60*   GLUCOSE mg/dL 111* 356*   CALCIUM mg/dL 9.7 9.8       Results from last 7 days  Lab Units 03/06/18  1606   BILIRUBIN mg/dL 0.5   ALK PHOS U/L 126*   ALT (SGPT) U/L 41*   AST (SGOT) U/L 26       Results from last 7 days  Lab Units 03/07/18  0532   CHOLESTEROL mg/dL 134   TRIGLYCERIDES mg/dL 134   HDL CHOL mg/dL 31*       Results from last 7 days  Lab Units 03/07/18  0532   TSH mIU/mL 3.994   HEMOGLOBIN A1C % 8.10*     Brief Urine Lab Results  (Last result in the past 365 days)      Color   Clarity   Blood   Leuk Est   Nitrite   Protein   CREAT   Urine HCG        03/06/18 1714 Yellow Clear Large (3+)(A) Negative Negative Negative               Microbiology Results Abnormal     Procedure Component Value -  "Date/Time    Urine Culture - Urine, Urine, Clean Catch [[id number]]  (Normal) Collected:  03/06/18 1714    Lab Status:  Preliminary result Specimen:  Urine from Urine, Clean Catch Updated:  03/07/18 0829     Urine Culture No growth at 24 hours          Imaging Results (all)     Procedure Component Value Units Date/Time    MRI Brain Without Contrast [[id number]] Collected:  03/06/18 1705     Updated:  03/06/18 1912    Addenda:        THIS REPORT CONTAINS FINDINGS THAT MAY BE CRITICAL TO PATIENT CARE. The   findings were verbally communicated via telephone conference with SHANNAN HERNANDEZ   at 7:11 PM EST on 3/6/2018. The findings were acknowledged and understood.    THIS DOCUMENT HAS BEEN ELECTRONICALLY SIGNED BY SAAD ADLER MD  Signed:  03/06/18 1912 by Saad Adler MD    Narrative:       EXAM:    MR Head Without Intravenous Contrast    CLINICAL HISTORY:    77 years old, male; Signs and symptoms; Weakness, extremity; Patient HX:   Stroke altered mental status x3 days. On 3/4/18 the wife noticed that the   patient was \"overall slow\" regarding his movement, thought processes, and   speech. Over the next few days, the wife noticed the patient was dragging his   right foot, had difficulty with handwriting, presence of word finding and new   onset generalized tremor. There is increased voice hoarseness accompanied by   intermittent slurred speech. The patient does state that he fell 2 days ago   down stairs with impact on his right wrist and right foot. There are no   complaints of right wrist pain or right foot pain or decreased rom from the   fall at this time. HX of prostate cancer    TECHNIQUE:    Magnetic resonance images of the head/brain without intravenous contrast in   multiple planes.    COMPARISON:    No relevant prior studies available.    FINDINGS:      Brain:  Faint acute to subacute left pontine infarction.  There are   periventricular and subcortical areas of flair high signal consistent with   chronic " small vessel ischemic disease.  No evidence of hemorrhage or mass   effect.  The cortical sulci are enlarged consistent with cerebral atrophy.      Ventricles:  The ventricles are mildly enlarged.      Bones/joints:  Unremarkable.      Sinuses:  Unremarkable as visualized.      Mastoid air cells:  Unremarkable as visualized.  No mastoid effusion.      Orbits:  Unremarkable as visualized.      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    THIS DOCUMENT HAS BEEN ELECTRONICALLY SIGNED BY JEMMA PERALES MD    XR Chest 1 View [[id number]] Collected:  03/06/18 2056     Updated:  03/06/18 2158    Narrative:       EXAM:    XR Chest, 1 View    CLINICAL HISTORY:    77 years old, male; Cerebral infarction, unspecified; Condition or disease;   Other: Stroke    TECHNIQUE:    Frontal view of the chest.    COMPARISON:    No relevant prior studies available.    FINDINGS:      Lungs:  Perihilar fullness with congestive changes. No focal consolidation      Pleural space:  Unremarkable.  No pneumothorax.      Heart:  Unremarkable.  No cardiomegaly.      Mediastinum:  Unremarkable.      Bones/joints:  Unremarkable.      Upper abdomen:  Elevation of right hemidiaphragm      Impression:         Congestive changes    THIS DOCUMENT HAS BEEN ELECTRONICALLY SIGNED BY JEMMA PERALES MD          Results for orders placed during the hospital encounter of 03/06/18   Duplex Carotid Ultrasound CAR    Narrative · No evidence of hemodynamically significant stenosis of bilateral carotid   arteries.  · Intimal thickening and minimal scattered plaque is noted.          Results for orders placed during the hospital encounter of 03/06/18   Duplex Carotid Ultrasound CAR    Narrative · No evidence of hemodynamically significant stenosis of bilateral carotid   arteries.  · Intimal thickening and minimal scattered plaque is noted.          Results for orders placed during the  hospital encounter of 03/06/18   Adult Transthoracic Echo Complete W/ Cont if Necessary Per Protocol (With Agitated Saline)    Narrative · Left ventricular systolic function is normal. Estimated EF = 70%.  · Left ventricular diastolic dysfunction.  · saline study negative for PFO           Order Current Status    Urine Culture - Urine, Urine, Clean Catch Preliminary result        Discharge Details      Papo Ann   Home Medication Instructions KACI:[id number]    Printed on:03/07/18 0698   Medication Information                      ACCU-CHEK HA PLUS test strip  TEST 6 TIMES A DAY             amLODIPine (NORVASC) 10 MG tablet  TK 1 T PO D             aspirin 81 MG EC tablet  Take 81 mg by mouth Daily.             atorvastatin (LIPITOR) 80 MG tablet  Take 1 tablet by mouth Every Night.             BD PEN NEEDLE ANT U/F 32G X 4 MM misc  INJECT FID AS DIRECTED             Cholecalciferol (VITAMIN D3) 2000 units capsule  Take 2,000 Units by mouth Daily.             clopidogrel (PLAVIX) 75 MG tablet  Take 75 mg by mouth Daily.             furosemide (LASIX) 40 MG tablet  Take 40 mg by mouth 2 (Two) Times a Day.             insulin lispro (humaLOG) 100 UNIT/ML injection  Inject 25 Units under the skin 3 (Three) Times a Day Before Meals.             metoprolol tartrate (LOPRESSOR) 50 MG tablet  TK 1 T PO BID             mirtazapine (REMERON) 30 MG tablet  TK 1 T PO HS             nitroglycerin (NITROSTAT) 0.4 MG SL tablet  Place 0.4 mg under the tongue Every 5 (Five) Minutes As Needed for Chest Pain. Take no more than 3 doses in 15 minutes.             NOVOLOG FLEXPEN 100 UNIT/ML solution pen-injector sc pen               pantoprazole (PROTONIX) 40 MG EC tablet  TK 1 T PO D             TOUJEO SOLOSTAR 300 UNIT/ML solution pen-injector  INJECT UP TO 65 UNITS EVERY NIGHT AT BEDTIME             zolpidem (AMBIEN) 5 MG tablet  Take 5 mg by mouth At Night As Needed for Sleep.                   Discharge  Disposition:  Home or Self Care    Discharge Diet: Cardiac      Discharge Activity: As tolerated       Future Appointments  Date Time Provider Department Center   3/9/2018 2:00 PM Nicki Underwood, PT  [facility] None   2/22/2019 10:00 AM MD ELDON Oneil [facility] None       Additional Instructions for the Follow-ups that You Need to Schedule     Ambulatory Referral to Occupational Therapy    As directed        Ambulatory Referral to Physical Therapy Evaluate and treat    As directed    Specialty modality needed?:  Evaluate and treat           Ambulatory Referral to Speech Therapy    As directed        Discharge Follow-up with PCP    As directed    Follow Up Details:  1 week hospital follow up           Discharge Follow-up with Specialty: CVA clinic - [facility] Toro or Deborah Decker; 1 Month    As directed    Specialty:  CVA clinic - [facility]o or Deborah Decker    Follow Up:  1 Month                     Time Spent on Discharge: 45 minutes    Electronically signed by Joy Rivera II, DO, 03/07/18, 1:19 PM.

## 2018-03-07 NOTE — THERAPY EVALUATION
Acute Care - Speech Language Pathology Initial Evaluation  [facility]   Cognitive-Communication Evaluation     Patient Name: Papo Ann  : 1940  MRN: [id number]  Today's Date: 3/7/2018  Onset of Illness/Injury or Date of Surgery Date: 18  Date of Referral to SLP: (P) 18         Admit Date: 3/6/2018     Visit Dx:    ICD-10-CM ICD-9-CM   1. Cerebrovascular accident (CVA), unspecified mechanism I63.9 434.91   2. Impaired functional mobility, balance, gait, and endurance Z74.09 V49.89   3. Impaired mobility and ADLs Z74.09 799.89     Patient Active Problem List   Diagnosis   • Prostate cancer   • Chronic kidney disease   • Hypertension   • Coronary artery disease involving native coronary artery of native heart without angina pectoris   • Cerebrovascular accident (CVA)   • Type 2 diabetes mellitus with complication, with long-term current use of insulin     Past Medical History:   Diagnosis Date   • Chronic kidney disease     renal failure no dialysis   • Diverticula of colon    • Hypertension    • Kidney stone 2015   • Pneumothorax, spontaneous, tension 1970   • Prostate cancer    • Skin cancer     Dr. Cisco Prater   • Ulcer duodenal hemorrhage      Past Surgical History:   Procedure Laterality Date   • COLONOSCOPY      Dr. Chairez   • CORONARY STENT PLACEMENT  10/21/2003    Dr. Izaguirre   • CORONARY STENT PLACEMENT  2018    Stent to LAD   • ESOPHAGEAL DILATATION      Dr. Weinstein polyps removed also   • HAND SURGERY Left     Dr. Marty Sommers   • HAND SURGERY Right     Dr. Lai   • HEMORRHOIDECTOMY     • PROSTATE BIOPSY  2017    Memo   • PROSTATE FIDUCIAL MARKER PLACEMENT  2017   • PROSTATE SURGERY      turp Dr. Hampton          SLP EVALUATION (last 72 hours)      SLP Evaluation       18 1040                Clinical Impression    Date of Referral to SLP (P)  18  -MD        SLP Diagnosis (P)  Mild dysarthria c/b slurred  speech at conversational level  -MD        Prognosis (P)  Good  -MD        Functional Level At Time Of Evaluation (P)  impaired  -MD        Criteria for Skilled Therapeutic Interventions Met (P)  skilled criteria for speech language intervention met  -MD        Rehab Potential (P)  good, to achieve stated therapy goals  -MD        Therapy Frequency (P)  5 times/wk  -MD        Predicted Duration of Therapy Intervention (days/wks) (P)  until discharge  -MD        Anticipated Discharge Disposition (P)  home with assist  -MD        Cognitive Assessment Intervention    Behavior/Mood Observations (P)  behavior appropriate to situation, UNIQUE/LUIS  -MD        Attention (P)  UNIQUE/LUIS  -MD        Pragmatics (P)  UNIQUE/LUIS BERUMEN        Problem Solving (P)  UNIQUE/LUIS BERUMEN        Sequencing (P)  UNIQUE/LUIS  -MD        Organization (P)  UNIQUE/LUIS  -MD        Diffuse Language Characteristics (P)  no concerns, diffuse language characteristics  -MD        Communication Assessment/Intervention    Communication Assessment (P)  Dysarthria;Slurred Speech  -MD        Auditory Comprehen Assess/Intervention    Auditory Comprehension (P)  UNIQUE/LUIS  -MD        Auditory Comprehen Assess/Intervention    Able to Identify Objects (P)  successful, multiple objects  -MD        Answers Yes/No Questions (P)  successful, basic questions;successful, personal questions  -MD        Able to Follow Commands (P)  success, 3-step commands  -MD        Verbal Expression Assess/Intervention    Automatic Speech (P)  successful, spontaneous greeting  -MD        Speech Fluency (P)  fluent speech  -MD        Initiation of Phonation (P)  UNIQUE/LUIS  -MD        Sustained Phonation (P)  UNIQUE/LUIS  -MD        Motor Speech Assess/Intervention    Motor Speech-Apraxia Observations (P)  no concerns  -MD        Motor Speech- Apraxia (P)  UNIQUE/LUIS  -MD        Motor Speech-Dysarthria Observations (P)  slurred speech  -MD        Motor Speech- Dysarthria (P)  successful, sentence  -MD         Motor Speech- Dysarthria Comment (P)  Mildly slurred speech in conversation; pt and wife report this is not baseline  -MD        Improve articulation    Improve articulation: (P)  by over-articulating in connected speech;90%;without cues  -MD        Status: Improve articulation (P)  New  -MD        Articulation Progress (P)  continue to address  -MD          User Key  (r) = Recorded By, (t) = Taken By, (c) = Cosigned By    Initials Name Effective Dates    MD Patricia Jaffe, Speech Therapy Student 03/07/18 -            EDUCATION  The patient has been educated in the following areas:   Communication Impairment.    SLP Recommendation and Plan  SLP Diagnosis: (P) Mild dysarthria c/b slurred speech at conversational level  Prognosis: (P) Good  Rehab Potential: (P) good, to achieve stated therapy goals  Criteria for Skilled Therapeutic Interventions Met: (P) skilled criteria for speech language intervention met  Anticipated Discharge Disposition: (P) home with assist     Therapy Frequency: (P) 5 times/wk  Predicted Duration of Therapy Intervention (days/wks): (P) until discharge       Plan of Care Review  Plan Of Care Reviewed With: (P) patient, spouse  Progress: (P)  (eval)  Outcome Summary/Follow up Plan: (P) Clinical dys and S/L eval completed this am. Pt alert and cooperative throughout w/ wife at bedside. Clinical dys: OME completed prior to PO trials. Pt presented w/ mild R facial weakness, however this did not negatively impact swallow safety. Trialed thins via tsp/cup/straw, puree, and solid. No overt s/s observed w/ any consistency. Rec: regular and thins, meds whole w/ thins, general aspiration/reflux precautions; no further dysphagia needs at this time.  S/L: Pt presents w/ mild dysarthria characterized by slurred speech/imprecise articulation at the conversational level. Pt and wife report that this is not baseline for pt. Receptive/expressive language skills appear to be WFL, including auditory  comprehension, word-finding, and speech fluency. Probed cog-com skills, which also appear to be WFL w/ no deficits observed in memory, problem solving, organization/sequencing, etc. Rec: Initiate ST targeting dysarthria; Pt would likely benefit from con't ST following d/c.          IP SLP Goals       03/07/18 1121          Dysarthria- Optimal Particpation in Care    Dysarthria Optimal Participation in Care- SLP, Date Established (P)  03/07/18  -MD      Dysarthria Optimal Participation in Care- SLP, Time to Achieve (P)  by discharge  -MD      Dysarthria Optimal Participation in Care- SLP, Activity Level (P)  Patient will improve intelligibility/comprehensibility of dysarthric speech by improving articulation for speech  -MD      Dysarthria Optimal Participation in Care- SLP, Date Goal Reviewed (P)  03/07/18  -MD      Dysarthria Optimal Participation in Care- SLP, Outcome (P)  goal ongoing  -MD        User Key  (r) = Recorded By, (t) = Taken By, (c) = Cosigned By    Initials Name Provider Type    MD Patricia Jaffe, Speech Therapy Student Speech Therapy Student              Time Calculation:         Time Calculation- SLP       03/07/18 1129          Time Calculation- SLP    SLP Start Time (P)  1040  -MD      SLP Received On (P)  03/07/18  -MD        User Key  (r) = Recorded By, (t) = Taken By, (c) = Cosigned By    Initials Name Provider Type    MD Patricia Jaffe, Speech Therapy Student Speech Therapy Student          Therapy Charges for Today     Code Description Service Date Service Provider Modifiers Qty    33859523326 HC ST EVAL ORAL PHARYNG SWALLOW 3 3/7/2018 Patricia Jaffe Speech Therapy Student GN 1    [id number] HC ST EVAL SPEECH AND PROD W LANG  3 3/7/2018 Patricia Jaffe Speech Therapy Student GN 1                     Patricia Jaffe Speech Therapy Student  3/7/2018 and Acute Care - Speech Language Pathology   Swallow Initial Evaluation  [facility]   Clinical Swallow Evaluation     Patient Name:  Papo Ann  : 1940  MRN: [id number]  Today's Date: 3/7/2018  Onset of Illness/Injury or Date of Surgery Date: 18  Date of Referral to SLP: (P) 18         Admit Date: 3/6/2018    Visit Dx:     ICD-10-CM ICD-9-CM   1. Cerebrovascular accident (CVA), unspecified mechanism I63.9 434.91   2. Impaired functional mobility, balance, gait, and endurance Z74.09 V49.89   3. Impaired mobility and ADLs Z74.09 799.89     Patient Active Problem List   Diagnosis   • Prostate cancer   • Chronic kidney disease   • Hypertension   • Coronary artery disease involving native coronary artery of native heart without angina pectoris   • Cerebrovascular accident (CVA)   • Type 2 diabetes mellitus with complication, with long-term current use of insulin     Past Medical History:   Diagnosis Date   • Chronic kidney disease     renal failure no dialysis   • Diverticula of colon    • Hypertension    • Kidney stone 2015   • Pneumothorax, spontaneous, tension 1970   • Prostate cancer    • Skin cancer     Dr. Cisco Prater   • Ulcer duodenal hemorrhage      Past Surgical History:   Procedure Laterality Date   • COLONOSCOPY      Dr. Chairez   • CORONARY STENT PLACEMENT  10/21/2003    Dr. Izaguirre   • CORONARY STENT PLACEMENT  2018    Stent to LAD   • ESOPHAGEAL DILATATION      Dr. Weinstein polyps removed also   • HAND SURGERY Left     Dr. Marty Sommers   • HAND SURGERY Right     Dr. Lai   • HEMORRHOIDECTOMY     • PROSTATE BIOPSY  2017    [facility]   • PROSTATE FIDUCIAL MARKER PLACEMENT  2017   • PROSTATE SURGERY      turp Dr. Hampton          SWALLOW EVALUATION (last 72 hours)      Swallow Evaluation       18 1040                Rehab Evaluation    Document Type (P)  evaluation  -MD        Subjective Information (P)  no complaints;agree to therapy  -MD        General Information    Patient Profile Review (P)  yes  -MD        Onset of Illness/Injury (P)  18   -MD        Subjective Patient Observations (P)  Pt alert and cooperative  -MD        Pertinent History Of Current Problem (P)  adm w/ acute L [place] CVA  -MD        Current Diet Limitations (P)  NPO  -MD        Precautions/Limitations, Vision (P)  WFL;other (see comments)   for eval  -MD        Precautions/Limitations, Hearing (P)  WFL;other (see comments)   for eval  -MD        Prior Level of Function- Communication (P)  functional for ADL's without assistance  -MD        Prior Level of Function- Swallowing (P)  no diet consistency restrictions  -MD        Plans/Goals Discussed With (P)  patient;spouse/S.O.;agreed upon  -MD        Barriers to Rehab (P)  none identified  -MD        Clinical Impression    Patient's Goals For Discharge (P)  return to all previous roles/activities  -MD        Family Goals For Discharge (P)  patient able to return to all previous activities/roles  -MD        SLP Swallowing Diagnosis (P)  other (see comments)   no overt s/s of orpharyngeal dysfunction, swallow WFL  -MD        Criteria for Skilled Therapeutic Interventions Met (P)  no problems identified which require skilled intervention  -MD        Therapy Frequency (P)  evaluation only  -MD        SLP Diet Recommendation (P)  regular textures;thin liquids  -MD        SLP Rec. for Method of Medication Administration (P)  meds whole with thin liquid  -MD        Monitor For Signs Of Aspiration (P)  cough;gurgly voice;throat clearing  -MD        Pain Assessment    Pain Assessment (P)  No/denies pain  -MD        Oral Motor Structure and Function    Oral Motor Anatomy and Physiology (P)  patient demonstrates anatomy and physiology that is WNL  -MD        Dentition Assessment (P)  present and adequate  -MD        Secretion Management (P)  WNL/WFL  -MD        Mucosal Quality (P)  moist, healthy  -MD        Velar Elevation (P)  WFL (within functional limits)  -MD        Volitional Swallow (P)  no difficulties initiating volitional swallow  -MD         Volitional Cough (P)  no difficulties initiating volitional cough  -MD        Oral Musculature General Assessment (P)  other (see comments)   mild R facial weakness  -MD        General Feeding/Swallowing Observations    Current Feeding Method (P)  NPO  -MD        Clinical Swallow Exam    Mode of Presentation (P)  fed by clinician;self fed;cup;spoon;straw  -MD        Oral Phase Results (P)  intact oral phase without signs of dysfunction  -MD        Pharyngeal Phase Results (P)  no signs/symptoms of pharyngeal impairment  -MD        Summary of Clinical Exam (P)  Clinical dys eval completed this am. Pt alert and cooperative w/ wife at bedside. OME completed. Pt presented w/ mild R facial weakness, however this did not negatively impact oral phase. Trialed thins via tsp/cup/straw, puree, and solid. No overt s/s observed w/ any consistency. Rec: regular and thins, meds whole w/ thins, general aspiration/reflux precautions; no further dysphagia needs at this time.    -MD        Swallow Recommendations    Oral Care (P)  oral care with toothbrush and dentifrice BID and PRN  -MD        Modified Eating Strategies (P)  reflux precautions;upright positioning 90 degrees  -MD        Recommended Diet (P)  regular textures;thin liquids  -MD          User Key  (r) = Recorded By, (t) = Taken By, (c) = Cosigned By    Initials Name Effective Dates    MD Patricia Jaffe, Speech Therapy Student 03/07/18 -         EDUCATION  The patient has been educated in the following areas:   Dysphagia (Swallowing Impairment) Oral Care/Hydration.    SLP Recommendation and Plan  SLP Swallowing Diagnosis: (P) other (see comments) (no overt s/s of orpharyngeal dysfunction, swallow WFL)  SLP Diet Recommendation: (P) regular textures, thin liquids     SLP Rec. for Method of Medication Administration: (P) meds whole with thin liquid  Monitor For Signs Of Aspiration: (P) cough, gurgly voice, throat clearing     Criteria for Skilled Therapeutic  Interventions Met: (P) no problems identified which require skilled intervention  Anticipated Discharge Disposition: (P) home with assist     Therapy Frequency: (P) evaluation only  Predicted Duration of Therapy Intervention (days/wks): (P) until discharge          Plan of Care Review  Plan Of Care Reviewed With: (P) patient, spouse  Progress: (P)  (eval)  Outcome Summary/Follow up Plan: (P) Clinical dys and S/L eval completed this am. Pt alert and cooperative throughout w/ wife at bedside. Clinical dys: OME completed prior to PO trials. Pt presented w/ mild R facial weakness, however this did not negatively impact swallow safety. Trialed thins via tsp/cup/straw, puree, and solid. No overt s/s observed w/ any consistency. Rec: regular and thins, meds whole w/ thins, general aspiration/reflux precautions; no further dysphagia needs at this time.  S/L: Pt presents w/ mild dysarthria characterized by slurred speech/imprecise articulation at the conversational level. Pt and wife report that this is not baseline for pt. Receptive/expressive language skills appear to be WFL, including auditory comprehension, word-finding, and speech fluency. Probed cog-com skills, which also appear to be WFL w/ no deficits observed in memory, problem solving, organization/sequencing, etc. Rec: Initiate ST targeting dysarthria; Pt would likely benefit from con't ST following d/c.          IP SLP Goals       03/07/18 1121          Dysarthria- Optimal Particpation in Care    Dysarthria Optimal Participation in Care- SLP, Date Established (P)  03/07/18  -MD      Dysarthria Optimal Participation in Care- SLP, Time to Achieve (P)  by discharge  -MD      Dysarthria Optimal Participation in Care- SLP, Activity Level (P)  Patient will improve intelligibility/comprehensibility of dysarthric speech by improving articulation for speech  -MD      Dysarthria Optimal Participation in Care- SLP, Date Goal Reviewed (P)  03/07/18  -MD      Dysarthriduane  Optimal Participation in Care- SLP, Outcome (P)  goal ongoing  -MD        User Key  (r) = Recorded By, (t) = Taken By, (c) = Cosigned By    Initials Name Provider Type    MD Patricia Jaffe, Speech Therapy Student Speech Therapy Student               Time Calculation:         Time Calculation- SLP       03/07/18 1129          Time Calculation- SLP    SLP Start Time (P)  1040  -MD      SLP Received On (P)  03/07/18  -MD        User Key  (r) = Recorded By, (t) = Taken By, (c) = Cosigned By    Initials Name Provider Type    MD Patricia Jaffe, Speech Therapy Student Speech Therapy Student          Therapy Charges for Today     Code Description Service Date Service Provider Modifiers Qty    [id number] HC ST EVAL ORAL PHARYNG SWALLOW 3 3/7/2018 Patricia Jaffe Speech Therapy Student GN 1    [id number] HC ST EVAL SPEECH AND PROD W LANG  3 3/7/2018 Patricia Jaffe Speech Therapy Student GN 1               Patricia Jaffe Speech Therapy Student  3/7/2018

## 2018-03-07 NOTE — THERAPY EVALUATION
Acute Care - Physical Therapy Initial Evaluation  [facility]     Patient Name: Papo Ann  : 1940  MRN: [id number]  Today's Date: 3/7/2018   Onset of Illness/Injury or Date of Surgery Date: 18  Date of Referral to PT: 18  Referring Physician: RICARDO SANTORO      Admit Date: 3/6/2018     Visit Dx:    ICD-10-CM ICD-9-CM   1. Cerebrovascular accident (CVA), unspecified mechanism I63.9 434.91   2. Impaired functional mobility, balance, gait, and endurance Z74.09 V49.89     Patient Active Problem List   Diagnosis   • Prostate cancer   • Chronic kidney disease   • Hypertension   • Coronary artery disease involving native coronary artery of native heart without angina pectoris   • Cerebrovascular accident (CVA)   • Type 2 diabetes mellitus with complication, with long-term current use of insulin     Past Medical History:   Diagnosis Date   • Chronic kidney disease     renal failure no dialysis   • Diverticula of colon    • Hypertension    • Kidney stone 2015   • Pneumothorax, spontaneous, tension 1970   • Prostate cancer    • Skin cancer     Dr. Cisco Prater   • Ulcer duodenal hemorrhage      Past Surgical History:   Procedure Laterality Date   • COLONOSCOPY      Dr. Chairez   • CORONARY STENT PLACEMENT  10/21/2003    Dr. Izaguirre   • CORONARY STENT PLACEMENT  2018    Stent to LAD   • ESOPHAGEAL DILATATION      Dr. Weinstein polyps removed also   • HAND SURGERY Left     Dr. Marty Sommers   • HAND SURGERY Right     Dr. Lai   • HEMORRHOIDECTOMY     • PROSTATE BIOPSY  2017    [facility]   • PROSTATE FIDUCIAL MARKER PLACEMENT  2017   • PROSTATE SURGERY      turp Dr. Hampton          PT ASSESSMENT (last 72 hours)      PT Evaluation       18 0818 18 0430    Rehab Evaluation    Document Type evaluation  -CD     Subjective Information complains of;weakness   slurred speech.   -CD     Patient Effort, Rehab Treatment good  -CD     General  Information    Patient Profile Review yes  -CD     Onset of Illness/Injury or Date of Surgery Date 03/06/18  -CD     Referring Physician RICARDO SANTORO  -CD     General Observations PT SUPINE UPON ARRIVAL ON RA AND WITH IV HEPLOCKED. WIFE PRESENT. DR MUIR IN AT END OF EVAL.   -CD     Pertinent History Of Current Problem PT TO ED AFTER 3 DAY HISTORY OF INTERMITTENT STUTTERING, SLURRED SPEECH, HAND WRITING DEFICITS AND R SIDED WEAKNESS. PT REPORTS FALLING ON 3/4. MRI REVEALED ACUTE TO SUB ACUTE L PONTINE INFARCT.   -CD     Precautions/Limitations fall precautions   R SIDED WEAKNESS, DECREASED COORDINATION.   -CD     Prior Level of Function independent:;all household mobility;community mobility;ADL's;using stairs;driving  -CD     Equipment Currently Used at Home bipap/ cpap  -CD     Plans/Goals Discussed With patient and family;agreed upon  -CD     Risks Reviewed patient and family:;LOB;change in vital signs  -CD     Benefits Reviewed patient and family:;improve function;increase independence;increase strength;increase balance;increase knowledge  -CD     Barriers to Rehab none identified  -CD     Living Environment    Lives With spouse  -CD     Living Arrangements condominium  -CD     Home Accessibility stairs (2 railings present);stairs to enter home;stairs within home;tub/shower is not walk in  -CD     Number of Stairs to Enter Home 3  -CD     Number of Stairs Within Home 15  -CD     Stair Railings at Home inside, present on right side   NONE OUTSIDE.   -CD     Clinical Impression    Date of Referral to PT 03/06/18  -CD     PT Diagnosis IMPAIRED FUNCTIONAL MOBILITY.   -CD     Patient/Family Goals Statement TO GO HOME. AGREES TO OPPT AT D/C.   -CD     Criteria for Skilled Therapeutic Interventions Met yes  -CD     Rehab Potential good, to achieve stated therapy goals  -CD     Vital Signs    Pre Systolic BP Rehab 145  -CD     Pre Treatment Diastolic BP 88  -CD     Intra Treatment Diastolic   -CD     Post  Systolic BP Rehab 90  -CD     Post Treatment Diastolic BP 61  -CD     Intratreatment Heart Rate (beats/min) 72  -CD     Pre SpO2 (%) 93  -CD     O2 Delivery Pre Treatment room air  -CD     Post SpO2 (%) 94  -CD     O2 Delivery Post Treatment room air  -CD     Pain Assessment    Pain Assessment No/denies pain  -CD     Vision Assessment/Intervention    Vision Comment TRACKING IN ROOM, READS SIGNS IN POLLACK ACCURATELY R/L, PERIPHERAL INTACT B.   -CD     Cognitive Assessment/Intervention    Current Cognitive/Communication Assessment functional   SLURRED.   -CD     Orientation Status oriented x 4  -CD     Follows Commands/Answers Questions 100% of the time  -CD     Personal Safety mild impairment;decreased awareness, need for assist;decreased awareness, need for safety;decreased insight to deficits  -CD     Personal Safety Interventions fall prevention program maintained;gait belt;muscle strengthening facilitated;nonskid shoes/slippers when out of bed;supervised activity  -CD     ROM (Range of Motion)    General ROM no range of motion deficits identified   B LE'S   -CD     MMT (Manual Muscle Testing)    General MMT Assessment lower extremity strength deficits identified   R LE 4/5. L LE 5/5.   -CD     Muscle Tone Assessment    Muscle Tone Assessment  --  -SF    Bed Mobility, Assessment/Treatment    Bed Mob, Supine to Sit, [place] independent  -CD     Transfer Assessment/Treatment    Transfers, Sit-Stand [place] contact guard assist  -CD     Transfers, Stand-Sit [place] contact guard assist  -CD     Transfers, Sit-Stand-Sit, Assist Device rolling walker  -CD     Transfer, Safety Issues step length decreased  -CD     Transfer, Impairments impaired balance;strength decreased;coordination impaired  -CD     Transfer, Comment CUES FOR HAND PLACEMENT.   -CD     Gait Assessment/Treatment    Gait, [place] Level contact guard assist  -CD     Gait, Assistive Device rolling walker   INITIALLY HHA ON L AND REQUIRED  MIN ASSIST.   -CD     Gait, Distance (Feet) 200  -CD     Gait, Gait Deviations leans to the right;step length decreased;weight-shifting ability decreased;jennifer decreased   VEERS RIGHT WITH GAIT.   -CD     Gait, Safety Issues step length decreased;weight-shifting ability decreased;balance decreased during turns  -CD     Gait, Impairments strength decreased;impaired balance;coordination impaired  -CD     Gait, Comment IMPROVED BALANCE AND STRIDE LENGTH WITH R WALKER.   -CD     Sensory Assessment/Intervention    Light Touch LLE;RLE   INTACT.   -CD     Positioning and Restraints    Pre-Treatment Position in bed  -CD     Post Treatment Position chair  -CD     In Chair sitting;call light within reach;notified nsg;encouraged to call for assist;with other staff;exit alarm on;with family/caregiver   DR MUIR PRESENT.   -       03/07/18 0200 03/07/18 0127    General Information    Equipment Currently Used at Home  bipap/ cpap  -SD    Living Environment    Lives With  spouse  -SD    Living Arrangements  condominium  -SD    Home Accessibility  stairs within home  -SD    Muscle Tone Assessment    Muscle Tone Assessment --  -SF       User Key  (r) = Recorded By, (t) = Taken By, (c) = Cosigned By    Initials Name Provider Type    CD Rosanne Welsh, PT Physical Therapist    SF Ines Huynh, RN Registered Nurse    SD Marlen Schafer, QUENTIN Registered Nurse          Physical Therapy Education     Title: PT OT SLP Therapies (Done)     Topic: Physical Therapy (Done)     Point: Mobility training (Done)    Learning Progress Summary    Learner Readiness Method Response Comment Documented by Status   Patient Acceptance E VU,NR S&S CVA, F.A.S.T. BENEFITS OF OOB ACTIVITY, SAFETY WITH MOBILITY, D/C RECOMMENDATIONS OF HOME WITH OPPT AND R WALKER.  03/07/18 0923 Done   Significant Other Acceptance E VU,NR S&S CVA, F.A.S.T. BENEFITS OF OOB ACTIVITY, SAFETY WITH MOBILITY, D/C RECOMMENDATIONS OF HOME WITH OPPT AND R WALKER.  03/07/18 0923  Done               Point: Home exercise program (Done)    Learning Progress Summary    Learner Readiness Method Response Comment Documented by Status   Patient Acceptance E VU,NR S&S CVA, F.A.S.T. BENEFITS OF OOB ACTIVITY, SAFETY WITH MOBILITY, D/C RECOMMENDATIONS OF HOME WITH OPPT AND R WALKER.  03/07/18 0923 Done   Significant Other Acceptance E VU,NR S&S CVA, F.A.S.T. BENEFITS OF OOB ACTIVITY, SAFETY WITH MOBILITY, D/C RECOMMENDATIONS OF HOME WITH OPPT AND R WALKER.  03/07/18 0923 Done               Point: Body mechanics (Done)    Learning Progress Summary    Learner Readiness Method Response Comment Documented by Status   Patient Acceptance E VU,NR S&S CVA, F.A.S.T. BENEFITS OF OOB ACTIVITY, SAFETY WITH MOBILITY, D/C RECOMMENDATIONS OF HOME WITH OPPT AND R WALKER.  03/07/18 0923 Done   Significant Other Acceptance E VU,NR S&S CVA, F.A.S.T. BENEFITS OF OOB ACTIVITY, SAFETY WITH MOBILITY, D/C RECOMMENDATIONS OF HOME WITH OPPT AND R WALKER.  03/07/18 0923 Done               Point: Precautions (Done)    Learning Progress Summary    Learner Readiness Method Response Comment Documented by Status   Patient Acceptance E VU,NR S&S CVA, F.A.S.T. BENEFITS OF OOB ACTIVITY, SAFETY WITH MOBILITY, D/C RECOMMENDATIONS OF HOME WITH OPPT AND R WALKER.  03/07/18 0923 Done   Significant Other Acceptance E VU,NR S&S CVA, F.A.S.T. BENEFITS OF OOB ACTIVITY, SAFETY WITH MOBILITY, D/C RECOMMENDATIONS OF HOME WITH OPPT AND R WALKER.  03/07/18 0923 Done                      User Key     Initials Effective Dates Name Provider Type Discipline     06/19/15 -  Rosanne Welsh, PT Physical Therapist PT                PT Recommendation and Plan  Anticipated Equipment Needs At Discharge: shower chair, front wheeled walker  Anticipated Discharge Disposition: home with assist, home with outpatient services  Planned Therapy Interventions: balance training, gait training, home exercise program, patient/family education, stair training,  strengthening, transfer training  Plan of Care Review  Plan Of Care Reviewed With: patient, spouse  Outcome Summary/Follow up Plan: PT PRESENTS WITH EVOLVING SYMPTOMS S/P CVA TO INCLUDE R SIDED WEAKNESS, IMPAIRED COORDINATION R LE, IMPAIRED BALANCE AND DECLINE IN FUNCTIONAL MOBILITY. RECOMMEND HOME WITH WIFE,   OPPT AND R WALKER. PT AMBULATED 200 FEET WITH CGA  AND R WALKER. PT A&O X3 AND FOLLOWS ALL COMMANDS.           IP PT Goals       03/07/18 0925          Transfer Training PT LTG    Transfer Training PT LTG, Activity Type all transfers  -CD      Transfer Training PT LTG, [place] Level independent  -CD      Transfer Training PT LTG, Assist Device --   WITH LEAST RESTRICTIVE ASSISTIVE DEVICE.   -CD      Gait Training PT LTG    Gait Training Goal PT LTG, Time to Achieve 2 wks  -CD      Gait Training Goal PT LTG, [place] Level independent  -CD      Gait Training Goal PT LTG, Assist Device --   WITH LEAST RESTRICTIVE ASSISTIVE DEVICE  -CD      Gait Training Goal PT LTG, Distance to Achieve 600  -CD      Stair Training PT LTG    Stair Training Goal PT LTG, Time to Achieve 2 wks  -CD      Stair Training Goal PT LTG, Number of Steps 12  -CD      Stair Training Goal PT LTG, [place] Level supervision required  -CD      Stair Training Goal PT LTG, Assist Device 1 handrail  -CD        User Key  (r) = Recorded By, (t) = Taken By, (c) = Cosigned By    Initials Name Provider Type    CD Rosanne Welsh, PT Physical Therapist                Outcome Measures       03/07/18 0818          How much help from another person do you currently need...    Turning from your back to your side while in flat bed without using bedrails? 4  -CD      Moving from lying on back to sitting on the side of a flat bed without bedrails? 4  -CD      Moving to and from a bed to a chair (including a wheelchair)? 3  -CD      Standing up from a chair using your arms (e.g., wheelchair, bedside chair)? 3  -CD      Climbing 3-5 steps with a  railing? 3  -CD      To walk in hospital room? 3  -CD      AM-PAC 6 Clicks Score 20  -CD      Modified [place] Scale    Modified [place] Scale 3 - Moderate disability.  Requiring some help, but able to walk without assistance.  -CD      Functional Assessment    Outcome Measure Options AM-PAC 6 Clicks Basic Mobility (PT);Modified [place]  -CD        User Key  (r) = Recorded By, (t) = Taken By, (c) = Cosigned By    Initials Name Provider Type    CARMEN Welsh, PT Physical Therapist           Time Calculation:         PT Charges       03/07/18 0932          Time Calculation    Start Time 0818  -CD      PT Received On 03/07/18  -      PT Goal Re-Cert Due Date 12/16/20  -      Time Calculation- PT    Total Timed Code Minutes- PT 15 minute(s)  -CD        User Key  (r) = Recorded By, (t) = Taken By, (c) = Cosigned By    Initials Name Provider Type    CARMEN Welsh, PT Physical Therapist          Therapy Charges for Today     Code Description Service Date Service Provider Modifiers Qty    64176554819 HC PT EVAL LOW COMPLEXITY 3 3/7/2018 Rosanne Welsh, PT GP 1    87491962693 HC GAIT TRAINING EA 15 MIN 3/7/2018 Rosanne Welsh, PT GP 1          PT G-Codes  Outcome Measure Options: AM-PAC 6 Clicks Basic Mobility (PT), Wilmer Welsh, PT  3/7/2018

## 2018-03-07 NOTE — PLAN OF CARE
Problem: Patient Care Overview (Adult)  Goal: Plan of Care Review  Outcome: Ongoing (interventions implemented as appropriate)   03/07/18 1148   Coping/Psychosocial Response Interventions   Plan Of Care Reviewed With patient;spouse   Outcome Evaluation   Outcome Summary/Follow up Plan Pt currently with UE motor deficits and decrease in ADL I due to evolving symptoms. Pt currently needs supv with mobility and ADL's. Pt would benefit from outpt upon discharge home with spouse to address balance and I.       Problem: Stroke (Ischemic) (Adult)  Goal: Signs and Symptoms of Listed Potential Problems Will be Absent or Manageable (Stroke)  Outcome: Ongoing (interventions implemented as appropriate)   03/07/18 1148   Stroke (Ischemic)   Problems Assessed (Stroke (Ischemic)/TIA) muscle tone abnormal;communication impairment;cognitive impairment;motor/sensory impairment   Problems Present (Stroke (Ischemic)/TIA) communication impairment;motor/sensory impairment       Problem: Inpatient Occupational Therapy  Goal: Transfer Training Goal 1 LTG- OT  Outcome: Ongoing (interventions implemented as appropriate)   03/07/18 1148   Transfer Training OT LTG   Transfer Training OT LTG, Date Established 03/07/18   Transfer Training OT LTG, Time to Achieve 1 wk   Transfer Training OT LTG, Activity Type all transfers   Transfer Training OT LTG, [place] Level set up required     Goal: Dynamic Standing Balance Goal LTG-OT  Outcome: Ongoing (interventions implemented as appropriate)   03/07/18 1148   Dynamic Standing Balance OT LTG   Dynamic Standing Balance OT LTG, Date Established 03/07/18   Dynamic Standing Balance OT LTG, Time to Achieve 1 wk   Dynamic Standing Balance OT LTG, [place] Level supervision required   Dynamic Standing Balance OT LTG, Additional Goal during ADL's including reaching to LE's.     Goal: LB Dressing Goal LTG- OT  Outcome: Ongoing (interventions implemented as appropriate)   03/07/18 1148   LB Dressing OT  LTG   LB Dressing Goal OT LTG, Date Established 03/07/18   LB Dressing Goal OT LTG, Time to Achieve 1 wk   LB Dressing Goal OT LTG, [place] Level set up required     Goal: Coordination Goal LTG- OT  Outcome: Ongoing (interventions implemented as appropriate)   03/07/18 1148   Coordination OT LTG   Coordination OT LTG, Date Established 03/07/18   Coordination OT LTG, Time to Achieve 1 wk   Coordination OT LTG, Activity Type FM task;GM task   Coordination OT LTG, [place] Level standby assist   Coordination OT LTG, Additional Goal during ADL with Raúl hands

## 2018-03-07 NOTE — CONSULTS
Neurology    Referring Provider: RICARDO Sloan    Reason for Consultation: stroke      Chief complaint: right hemiparesis and speech problems    Subjective .     History of present illness:  Mr. Ann  is a pleasant 77-year-old male with a past medical history significant for CKD, hypertension, diabetes mellitus, hyperlipidemia who is admitted to the hospitalist service for right hemiparesis and speech problems.  He reports a three-day history of waxing and waning intermittent slurred speech.  At first his wife did not think much of this but then she noticed that his handwriting became somewhat illegible.  He was brought into the ER for further evaluation.  He was found to be hypertensive on arrival.  MRI showed an acute to subacute left pontine infarct.    Review of Systems: Positive for weakness and slurred speech.Otherwise complete review of systems was discussed with the patient and found to be negative except for that mentioned in history of present illness.    History  Past Medical History:   Diagnosis Date   • Chronic kidney disease 2005    renal failure no dialysis   • Diverticula of colon 1973   • Hypertension    • Kidney stone 04/12/2015   • Pneumothorax, spontaneous, tension 1970   • Prostate cancer    • Skin cancer 2009    Dr. Cisco Prater   • Ulcer duodenal hemorrhage 1973   ,   Past Surgical History:   Procedure Laterality Date   • COLONOSCOPY  2014    Dr. Chairez   • CORONARY STENT PLACEMENT  10/21/2003    Dr. Izaguirre   • CORONARY STENT PLACEMENT  01/2018    Stent to LAD   • ESOPHAGEAL DILATATION  2012    Dr. Weinstein polyps removed also   • HAND SURGERY Left 2008    Dr. Marty Sommers   • HAND SURGERY Right 2010    Dr. Lai   • HEMORRHOIDECTOMY  1976   • PROSTATE BIOPSY  03/03/2017    [facility]   • PROSTATE FIDUCIAL MARKER PLACEMENT  06/12/2017   • PROSTATE SURGERY  1999    turp Dr. Hampton   ,   Family History   Problem Relation Age of Onset   • Heart disease Mother    • Lung cancer Mother    •  Alzheimer's disease Mother    • Heart failure Father    • Prostate cancer Son    ,   Social History   Substance Use Topics   • Smoking status: Former Smoker     Packs/day: 3.00     Quit date: 1982   • Smokeless tobacco: Never Used   • Alcohol use Yes      Comment: twice per year   ,   Prescriptions Prior to Admission   Medication Sig Dispense Refill Last Dose   • amLODIPine (NORVASC) 10 MG tablet TK 1 T PO D  3 3/7/2018 at Unknown time   • aspirin 81 MG EC tablet Take 81 mg by mouth Daily.   3/7/2018 at Unknown time   • atorvastatin (LIPITOR) 40 MG tablet Take 40 mg by mouth Daily.   3/6/2018 at Unknown time   • Cholecalciferol (VITAMIN D3) 2000 units capsule Take 2,000 Units by mouth Daily.   3/7/2018 at Unknown time   • clopidogrel (PLAVIX) 75 MG tablet Take 75 mg by mouth Daily.   3/7/2018 at Unknown time   • furosemide (LASIX) 40 MG tablet Take 40 mg by mouth 2 (Two) Times a Day.   Past Month at Unknown time   • metoprolol tartrate (LOPRESSOR) 50 MG tablet TK 1 T PO BID  1 3/7/2018 at Unknown time   • mirtazapine (REMERON) 30 MG tablet TK 1 T PO HS  3 3/6/2018 at Unknown time   • pantoprazole (PROTONIX) 40 MG EC tablet TK 1 T PO D  3 3/7/2018 at Unknown time   • zolpidem (AMBIEN) 5 MG tablet Take 5 mg by mouth At Night As Needed for Sleep.   3/6/2018 at Unknown time   • ACCU-CHEK HA PLUS test strip TEST 6 TIMES A DAY  4 Taking   • BD PEN NEEDLE ANT U/F 32G X 4 MM misc INJECT FID AS DIRECTED  11 Taking   • insulin lispro (humaLOG) 100 UNIT/ML injection Inject 25 Units under the skin 3 (Three) Times a Day Before Meals.   Taking   • nitroglycerin (NITROSTAT) 0.4 MG SL tablet Place 0.4 mg under the tongue Every 5 (Five) Minutes As Needed for Chest Pain. Take no more than 3 doses in 15 minutes.   Taking   • NOVOLOG FLEXPEN 100 UNIT/ML solution pen-injector sc pen   3 Taking   • TOUJEO SOLOSTAR 300 UNIT/ML solution pen-injector INJECT UP TO 65 UNITS EVERY NIGHT AT BEDTIME  5 Taking    and Allergies:  Gayla  "[cefaclor]; Codeine; Erythromycin; Flomax [tamsulosin]; Keflex [cephalexin]; Other; Penicillins; Sulfa antibiotics; Adhesive tape; Glucophage [metformin]; Hctz [hydrochlorothiazide]; and Lisinopril    Objective     Vital Signs   Blood pressure 168/90, pulse 87, temperature 98.1 °F (36.7 °C), temperature source Oral, resp. rate 20, height 170.2 cm (67\"), weight 89.4 kg (197 lb), SpO2 93 %.    Physical Exam:                            Gen: Lying in bed with eyes open. In NAD. Appears stated age                        Eyes: PERRL, conjuntivae/lids normal                        ENT: External canals normal bilaterally. Oropharynx normal.                         Neck: Supple. No thyroid enlargement noted                        Respiratory: CTA bilaterally. Respirations unlabored                        CV: RRR, S1 and S2 nml. Radial pulses 2+ bilaterally.                         Skin: No rashes noted on exposed skin. Normal tugor.                        MSK: Normal bulk and tone. Nml ROM      Neurologic:                        Mental status: Awake, alert, oriented x4. Follows commands. Speech fluent.                         CN: PERRL, EOM intact, sensation intact in upper/mid/lower face bilaterally, facial movements symmetric, hearing intact to finger rub bilaterally, palate elevates symmetrically, tongue movements and SCMs strong bilaterally                         Motor: Full strength noted in the left arm and bilateral lower extremities throughout.  There is mild weakness in the right upper extremity about 4+/5                         Reflexes: 2+ throughout                        Sensory: Intact to LT throughout                        Coordination: No dysmetria noted                        Gait: Not tested        Results Reviewed:     Labs reviewed.  A1c 8.1, LDL 95  MRI brain personally reviewed.  There is a linear area of restricted diffusion seen in the left paramedian arias  TTE and carotid duplex reports " reviewed  EKG report reviewed                 Assessment/Plan     1.  Acute ischemic stroke = left pontine infarct seen on MRI.  Mechanism likely due to small vessel disease.  TTE and carotid duplex reports unremarkable.  Continue aspirin, Plavix, and atorvastatin.     2.  Hypertension = uncontrolled but improving.  Continue meds     3.  Diabetes mellitus = A1c 8.1.  Per wife this is an improvement from his recent check at his PCPs office.  Continue meds and glycemic monitoring     4.  Hyperlipidemia = LDL 95.  On atorvastatin     Okay from neuro standpoint for discharge when okay with primary team.  Follow-up in neurology clinic with RICARDO Farr, first available appointment        Joy Coulter MD  03/07/18  1:58 PM

## 2018-03-07 NOTE — PROGRESS NOTES
Discharge Planning Assessment  [facility]     Patient Name: Papo Ann  MRN: [id number]  Today's Date: 3/7/2018    Admit Date: 3/6/2018          Discharge Needs Assessment       03/07/18 1004    Living Environment    Lives With spouse    Living Arrangements condominium    Home Accessibility bed and bath are not on the first floor;tub/shower is not walk in;stairs to enter home;stairs within home    Number of Stairs to Enter Home 3    Number of Stairs Within Home 15    Stair Railings at Home inside, present on right side    Type of Financial/Environmental Concern none    Transportation Available car;family or friend will provide    Living Environment    Provides Primary Care For no one    Quality Of Family Relationships supportive;involved    Able to Return to Prior Living Arrangements yes    Discharge Needs Assessment    Concerns To Be Addressed discharge planning concerns    Readmission Within The Last 30 Days no previous admission in last 30 days    Anticipated Changes Related to Illness none    Equipment Currently Used at Home bipap/ cpap    Equipment Needed After Discharge none    Discharge Disposition still a patient            Discharge Plan       03/07/18 1006    Case Management/Social Work Plan    Plan Home    Patient/Family In Agreement With Plan yes    Additional Comments Spoke with patient's wife in room to initiate discharge planning.  He lives with her in [facility].  Prior to admission, he was independent with ADL's.  He has a CPAP at home and has never had home health services.  Mr. Ann has RX coverage and has his scripts filled at [facility] in [place].  His plan is to return home with wife at discharge.  Therapy recommending a rolling walker and shower chair at discharge.  Explained to patient's wife that insurance does not cover items in the bathroom.  Called [place] with [employer].  Rolling walker will be delivered to patient's bedside prior to discharge.  CM will continue to  follow.  Sole Orozco RN [phone]        Discharge Placement     No information found        Expected Discharge Date and Time     Expected Discharge Date Expected Discharge Time    Mar 9, 2018               Demographic Summary       03/07/18 1001    Referral Information    Admission Type inpatient    Arrived From home or self-care    Referral Source admission list    Reason For Consult discharge planning    Record Reviewed history and physical;medical record    Primary Care Physician Information    Name Juan Manuel Chung            Functional Status       03/07/18 1003    Functional Status Current    Current Functional Level Comment see nurse's notes    Change in Functional Status Since Onset of Current Illness/Injury yes    Functional Status Prior    Ambulation 0-->independent    Transferring 0-->independent    Toileting 0-->independent    Bathing 0-->independent    Dressing 0-->independent    Eating 0-->independent    Communication 0-->understands/communicates without difficulty    IADL    Medications independent    Meal Preparation independent    Housekeeping independent    Laundry independent    Shopping independent    Oral Care independent    Activity Tolerance    Current Activity Limitations none    Usual Activity Tolerance good    Current Activity Tolerance good    Employment/Financial    Employment/Finance Comments Verified with patient's wife that he has [employer] M[employer].  No issues obtaining medications.            Psychosocial     None            Abuse/Neglect     None            Legal     None            Substance Abuse     None            Patient Forms     None          Sole Orozco RN

## 2018-03-07 NOTE — PLAN OF CARE
Problem: Patient Care Overview (Adult)  Goal: Plan of Care Review  Outcome: Ongoing (interventions implemented as appropriate)   03/07/18 1121   Coping/Psychosocial Response Interventions   Plan Of Care Reviewed With patient;spouse   Patient Care Overview   Progress (eval)   Outcome Evaluation   Outcome Summary/Follow up Plan Clinical dys and S/L eval completed this am. Pt alert and cooperative throughout w/ wife at bedside.     Clinical dys: OME completed prior to PO trials. Pt presented w/ mild R facial weakness, however this did not negatively impact swallow safety. Trialed thins via tsp/cup/straw, puree, and solid. No overt s/s observed w/ any consistency. Rec: regular and thins, meds whole w/ thins, general aspiration/reflux precautions; no further dysphagia needs at this time.     S/L: Pt presents w/ mild dysarthria characterized by slurred speech/imprecise articulation at the conversational level. Pt and wife report that this is not baseline for pt. Receptive/expressive language skills appear to be WFL, including auditory comprehension, word-finding, and speech fluency. Probed cog-com skills, which also appear to be WFL w/ no deficits observed in memory, problem solving, organization/sequencing, etc. Rec: Initiate ST targeting dysarthria; Pt would likely benefit from con't ST following d/c.       Problem: Stroke (Ischemic) (Adult)  Goal: Signs and Symptoms of Listed Potential Problems Will be Absent or Manageable (Stroke)  Outcome: Ongoing (interventions implemented as appropriate)   03/07/18 1121   Stroke (Ischemic)   Problems Assessed (Stroke (Ischemic)/TIA) communication impairment;cognitive impairment;eating/swallowing impairment   Problems Present (Stroke (Ischemic)/TIA) communication impairment       Problem: Inpatient SLP  Goal: Dysarthria-Patient will improve motor speech skills to allow optimal participation in care  Outcome: Ongoing (interventions implemented as appropriate)   03/07/18 1121    Dysarthria- Optimal Particpation in Care   Dysarthria Optimal Participation in Care- SLP, Date Established 03/07/18   Dysarthria Optimal Participation in Care- SLP, Time to Achieve by discharge   Dysarthria Optimal Participation in Care- SLP, Activity Level Patient will improve intelligibility/comprehensibility of dysarthric speech by improving articulation for speech   Dysarthria Optimal Participation in Care- SLP, Date Goal Reviewed 03/07/18   Dysarthria Optimal Participation in Care- SLP, Outcome goal ongoing

## 2018-03-07 NOTE — PLAN OF CARE
Problem: Patient Care Overview (Adult)  Goal: Plan of Care Review  Outcome: Ongoing (interventions implemented as appropriate)   03/07/18 0925   Coping/Psychosocial Response Interventions   Plan Of Care Reviewed With patient;spouse   Outcome Evaluation   Outcome Summary/Follow up Plan PT PRESENTS WITH EVOLVING SYMPTOMS S/P CVA TO INCLUDE R SIDED WEAKNESS, IMPAIRED COORDINATION R LE, IMPAIRED BALANCE AND DECLINE IN FUNCTIONAL MOBILITY. RECOMMEND HOME WITH WIFE, OPPT AND R WALKER. PT AMBULATED 200 FEET WITH CGA AND R WALKER. PT A&O X3 AND FOLLOWS ALL COMMANDS.        Problem: Stroke (Ischemic) (Adult)  Goal: Signs and Symptoms of Listed Potential Problems Will be Absent or Manageable (Stroke)  Outcome: Ongoing (interventions implemented as appropriate)   03/07/18 0925   Stroke (Ischemic)   Problems Assessed (Stroke (Ischemic)/TIA) cognitive impairment;communication impairment;motor/sensory impairment   Problems Present (Stroke (Ischemic)/TIA) motor/sensory impairment;communication impairment       Problem: Inpatient Physical Therapy  Goal: Transfer Training Goal 1 LTG- PT  Outcome: Ongoing (interventions implemented as appropriate)   03/07/18 0925   Transfer Training PT LTG   Transfer Training PT LTG, Activity Type all transfers   Transfer Training PT LTG, [place] Level independent   Transfer Training PT LTG, Assist Device (WITH LEAST RESTRICTIVE ASSISTIVE DEVICE. )     Goal: Gait Training Goal LTG- PT  Outcome: Ongoing (interventions implemented as appropriate)   03/07/18 0925   Gait Training PT LTG   Gait Training Goal PT LTG, Time to Achieve 2 wks   Gait Training Goal PT LTG, [place] Level independent   Gait Training Goal PT LTG, Assist Device (WITH LEAST RESTRICTIVE ASSISTIVE DEVICE)   Gait Training Goal PT LTG, Distance to Achieve 600     Goal: Stair Training Goal LTG- PT  Outcome: Ongoing (interventions implemented as appropriate)   03/07/18 0925   Stair Training PT LTG   Stair Training Goal PT LTG,  Time to Achieve 2 wks   Stair Training Goal PT LTG, Number of Steps 12   Stair Training Goal PT LTG, [place] Level supervision required   Stair Training Goal PT LTG, Assist Device 1 handrail

## 2018-03-07 NOTE — PLAN OF CARE
Problem: Patient Care Overview (Adult)  Goal: Plan of Care Review  Outcome: Ongoing (interventions implemented as appropriate)    Goal: Adult Individualization and Mutuality  Outcome: Ongoing (interventions implemented as appropriate)    Goal: Discharge Needs Assessment  Outcome: Ongoing (interventions implemented as appropriate)      Problem: Fall Risk (Adult)  Goal: Identify Related Risk Factors and Signs and Symptoms  Outcome: Ongoing (interventions implemented as appropriate)    Goal: Absence of Falls  Outcome: Ongoing (interventions implemented as appropriate)      Problem: Stroke (Ischemic) (Adult)  Goal: Signs and Symptoms of Listed Potential Problems Will be Absent or Manageable (Stroke)  Outcome: Ongoing (interventions implemented as appropriate)

## 2018-03-07 NOTE — CONSULTS
"Diabetes Education  Assessment/Teaching    Patient Name:  Papo Ann  YOB: 1940  MRN: [id number]  Admit Date:  3/6/2018      Assessment Date:  3/7/2018       Most Recent Value    General Information      Referral From:  Blood glucose, MD order    Height  170.2 cm (67\")    Height Method  Stated    Weight  89.4 kg (197 lb)    Weight Method  Stated    Pregnancy Assessment     Diabetes History     What type of diabetes do you have?  Type 2    Length of Diabetes Diagnosis  10 + years    Current DM knowledge  good [wife is a nurse she said and oversees his care]    Have you had diabetes education/teaching in the past?  yes    Do you test your blood sugar at home?  yes    Do you have any diabetes complications?  nephropathy    Education Preferences     Nutrition Information     Assessment Topics     DM Goals                Most Recent Value    DM Education Needs     Meter  Has own    Frequency of Testing  3 times a day    Blood Glucose Target  -- [provided and discussed ADA recommended glucose and a1C goals]    Medication  Insulin    Problem Solving  Hypoglycemia, Hyperglycemia, Sick days, Signs, Symptoms, Treatment    Reducing Risks  A1C testing    Healthy Coping  Appropriate    Discharge Plan  Follow-up with endocrinolgoist    Motivation  Engaged, Strong    Teaching Method  Explanation    Patient Response  Verbalized understanding            Other Comments: reviewed home diabetes management. His wife manages his care. She said she is doing 45 gm CHO for each of his meals and 15 gm for 2 snacks a day. She restricts his soduim to 1500 mg-1900 mg/day. She said controlling the soduim is \"a work in progress.\" they were pleased with the 8.1% A1C saying it was 9.4% not long ago.      Electronically signed by:  Jacqueline Adkins RN  03/07/18 12:14 PM  "

## 2018-03-07 NOTE — THERAPY EVALUATION
Acute Care - Occupational Therapy Initial Evaluation  [facility]     Patient Name: Papo Ann  : 1940  MRN: [id number]  Today's Date: 3/7/2018  Onset of Illness/Injury or Date of Surgery Date: 18  Date of Referral to OT: 18  Referring Physician: RICARDO Kurtz    Admit Date: 3/6/2018       ICD-10-CM ICD-9-CM   1. Cerebrovascular accident (CVA), unspecified mechanism I63.9 434.91   2. Impaired functional mobility, balance, gait, and endurance Z74.09 V49.89   3. Impaired mobility and ADLs Z74.09 799.89     Patient Active Problem List   Diagnosis   • Prostate cancer   • Chronic kidney disease   • Hypertension   • Coronary artery disease involving native coronary artery of native heart without angina pectoris   • Cerebrovascular accident (CVA)   • Type 2 diabetes mellitus with complication, with long-term current use of insulin     Past Medical History:   Diagnosis Date   • Chronic kidney disease     renal failure no dialysis   • Diverticula of colon    • Hypertension    • Kidney stone 2015   • Pneumothorax, spontaneous, tension 1970   • Prostate cancer    • Skin cancer     Dr. Cisco Prater   • Ulcer duodenal hemorrhage      Past Surgical History:   Procedure Laterality Date   • COLONOSCOPY      Dr. Chairez   • CORONARY STENT PLACEMENT  10/21/2003    Dr. Izaguirre   • CORONARY STENT PLACEMENT  2018    Stent to LAD   • ESOPHAGEAL DILATATION      Dr. Weinstein polyps removed also   • HAND SURGERY Left     Dr. Marty Sommers   • HAND SURGERY Right     Dr. Lai   • HEMORRHOIDECTOMY  1976   • PROSTATE BIOPSY  2017    Memo   • PROSTATE FIDUCIAL MARKER PLACEMENT  2017   • PROSTATE SURGERY      turp Dr. Hampton          OT ASSESSMENT FLOWSHEET (last 72 hours)      OT Evaluation       18 1100 18 1049 18 1040 18 1004 18 1003    Rehab Evaluation    Document Type evaluation  -AN  (P)  evaluation  -MD Llamas  Information no complaints;agree to therapy  -AN  (P)  no complaints;agree to therapy  -MD      Evaluation Not Performed  patient unavailable for evaluation   pt. gone to testing, then with SLP  -AN       Patient Effort, Rehab Treatment good  -AN        Symptoms Noted During/After Treatment none  -AN        General Information    Patient Profile Review yes  -AN        Onset of Illness/Injury or Date of Surgery Date 03/06/18  -AN        Referring Physician RICARDO Kurtz  -AN        General Observations Pt sitting up in chair, spouse present  -AN        Pertinent History Of Current Problem Pt to ED following 3 days of intermittent speech impairments, R side weaknes and changes in R UE coordination. Pt also had a fall, has increased UE tremors and was dragging R foot.  -AN        Precautions/Limitations fall precautions  -AN        Prior Level of Function independent:;gait;transfer;all household mobility;home management;driving;ADL's  -AN        Equipment Currently Used at Home bipap/ cpap  -AN   bipap/ cpap  -PS     Plans/Goals Discussed With patient and family;agreed upon  -AN        Risks Reviewed patient and family:;LOB;dizziness;increased discomfort;change in vital signs  -AN        Benefits Reviewed patient and family:;improve function;increase independence;increase strength;increase balance  -AN        Barriers to Rehab none identified  -AN        Living Environment    Lives With spouse  -AN   spouse  -PS     Living Arrangements house  -AN   condominium  -PS     Home Accessibility stairs to enter home;tub/shower is not walk in;stairs within home  -AN   bed and bath are not on the first floor;tub/shower is not walk in;stairs to enter home;stairs within home  -PS     Number of Stairs to Enter Home 3  -AN   3  -PS     Number of Stairs Within Home 15  -AN   15  -PS     Stair Railings at Home inside, present on right side  -AN   inside, present on right side  -PS     Type of Financial/Environmental Concern    none  -PS      Transportation Available    car;family or friend will provide  -PS     Clinical Impression    Date of Referral to OT 03/06/18  -AN        OT Diagnosis Decreased ADL I  -AN        Impairments Found (describe specific impairments) gait, locomotion, and balance;motor function;muscle performance  -AN        Patient/Family Goals Statement Agreeable to OT  -AN        Criteria for Skilled Therapeutic Interventions Met yes;treatment indicated  -AN        Rehab Potential good, to achieve stated therapy goals  -AN        Therapy Frequency daily  -AN        Anticipated Equipment Needs At Discharge --   TBD  -AN        Anticipated Discharge Disposition home with outpatient services  -AN        Functional Level Prior    Ambulation     0-->independent  -PS    Transferring     0-->independent  -PS    Toileting     0-->independent  -PS    Bathing     0-->independent  -PS    Dressing     0-->independent  -PS    Eating     0-->independent  -PS    Communication     0-->understands/communicates without difficulty  -PS    Vital Signs    Pre Systolic BP Rehab --   RN staff, Diabetes educator in and out, notified Rn  -AN        Pain Assessment    Pain Assessment No/denies pain  -AN  (P)  No/denies pain  -MD      Vision Assessment/Intervention    Visual Impairment WFL with corrective lenses  -AN        Cognitive Assessment/Intervention    Current Cognitive/Communication Assessment functional  -AN        Orientation Status oriented x 4  -AN        Follows Commands/Answers Questions 100% of the time  -AN        Personal Safety WNL/WFL  -AN        Personal Safety Interventions fall prevention program maintained;gait belt;nonskid shoes/slippers when out of bed;supervised activity  -AN        Cognitive Assessment Intervention    Behavior/Mood Observations   (P)  behavior appropriate to situation, BAYRON BERUMEN      Attention   (P)  BAYRON BERUMEN      Pragmatics   (P)  BAYRON BERUMEN      Problem Solving   (P)  BAYRON BERUMEN      Sequencing   (P)   UNIQUE/LUIS BERUMEN      Organization   (P)  UNIQUE/LUIS  -MD      Diffuse Language Characteristics   (P)  no concerns, diffuse language characteristics  -MD      ROM (Range of Motion)    General ROM no range of motion deficits identified  -AN        MMT (Manual Muscle Testing)    General MMT Assessment upper extremity strength deficits identified  -AN        General MMT Assessment Detail R UE 4/5, L 4+/5  -AN        Bed Mobility, Assessment/Treatment    Bed Mobility, Comment pt in chair  -AN        Transfer Assessment/Treatment    Transfers, Sit-Stand [place] supervision required  -AN        Transfers, Stand-Sit [place] supervision required  -AN        Toilet Transfer, [place] supervision required  -AN        Toilet Transfer, Assistive Device elevated toilet seat  -AN        Transfer, Safety Issues step length decreased  -AN        Functional Mobility    Functional Mobility- Ind. Level supervision required  -AN        Functional Mobility-Distance (Feet) 10  -AN        Upper Body Bathing Assessment/Training    UB Bathing Assess/Train, Comment simulate I  -AN        Lower Body Bathing Assessment/Training    LB Bathing Assess/Train, Comment simulate supv with sitting  -AN        Toileting Assessment/Training    Toileting Assess/Train, Position sitting  -AN        Toileting Assess/Train, Indepen Level conditional independence  -AN        Motor Skills/Interventions    Additional Documentation Balance Skills Training (Group);Fine Motor Coordination Training (Group);Gross Motor Coordination Training (Group)  -AN        Balance Skills Training    Sitting-Level of Assistance Distant supervision  -AN        Standing-Level of Assistance Distant supervision  -AN        Gross Motor Coordination Training    Gross Motor Skill, Impairments Detail Resting tremor on R, intentional Raúl tremors  -AN        Fine Motor Coordination Training    Detail (Fine Motor Coordination Training) impaired R   -AN        Sensory  Assessment/Intervention    Light Touch LUE;RUE  -AN        LUE Light Touch WNL  -AN        General Therapy Interventions    Planned Therapy Interventions activity intolerance;ADL retraining;IADL retraining;balance training;strengthening;transfer training  -AN        Positioning and Restraints    Pre-Treatment Position sitting in chair/recliner  -AN        Post Treatment Position chair  -AN        In Chair reclined;call light within reach;encouraged to call for assist  -AN          03/07/18 0818 03/07/18 0430 03/07/18 0200 03/07/18 0127       Rehab Evaluation    Document Type evaluation  -CD        Subjective Information complains of;weakness   slurred speech.   -CD        Patient Effort, Rehab Treatment good  -CD        General Information    Patient Profile Review yes  -CD        Onset of Illness/Injury or Date of Surgery Date 03/06/18  -CD        Referring Physician RICARDO SANTORO  -CD        General Observations PT SUPINE UPON ARRIVAL ON RA AND WITH IV HEPLOCKED. WIFE PRESENT. DR MUIR IN AT END OF EVAL.   -CD        Pertinent History Of Current Problem PT TO ED AFTER 3 DAY HISTORY OF INTERMITTENT STUTTERING, SLURRED SPEECH, HAND WRITING DEFICITS AND R SIDED WEAKNESS. PT REPORTS FALLING ON 3/4. MRI REVEALED ACUTE TO SUB ACUTE L PONTINE INFARCT.   -CD        Precautions/Limitations fall precautions   R SIDED WEAKNESS, DECREASED COORDINATION.   -CD        Prior Level of Function independent:;all household mobility;community mobility;ADL's;using stairs;driving  -CD        Equipment Currently Used at Home bipap/ cpap  -CD   bipap/ cpap  -SD     Plans/Goals Discussed With patient and family;agreed upon  -CD        Risks Reviewed patient and family:;LOB;change in vital signs  -CD        Benefits Reviewed patient and family:;improve function;increase independence;increase strength;increase balance;increase knowledge  -CD        Barriers to Rehab none identified  -CD        Living Environment    Lives With spouse  -CD    spouse  -SD     Living Arrangements condominium  -CD   condominium  -SD     Home Accessibility stairs (2 railings present);stairs to enter home;stairs within home;tub/shower is not walk in  -CD   stairs within home  -SD     Number of Stairs to Enter Home 3  -CD        Number of Stairs Within Home 15  -CD        Stair Railings at Home inside, present on right side   NONE OUTSIDE.   -CD        Functional Level Prior    Ambulation    0-->independent  -SD     Transferring    0-->independent  -SD     Toileting    0-->independent  -SD     Bathing    0-->independent  -SD     Dressing    0-->independent  -SD     Eating    0-->independent  -SD     Communication    0-->understands/communicates without difficulty  -SD     Swallowing    0-->swallows foods/liquids without difficulty  -SD     Prior Functional Level Comment    independent  -SD     Vital Signs    Pre Systolic BP Rehab 145  -CD        Pre Treatment Diastolic BP 88  -CD        Intra Treatment Diastolic   -CD        Post Systolic BP Rehab 90  -CD        Post Treatment Diastolic BP 61  -CD        Intratreatment Heart Rate (beats/min) 72  -CD        Pre SpO2 (%) 93  -CD        O2 Delivery Pre Treatment room air  -CD        Post SpO2 (%) 94  -CD        O2 Delivery Post Treatment room air  -CD        Pain Assessment    Pain Assessment No/denies pain  -CD        Vision Assessment/Intervention    Vision Comment TRACKING IN ROOM, READS SIGNS IN POLLACK ACCURATELY R/L, PERIPHERAL INTACT B.   -CD        Cognitive Assessment/Intervention    Current Cognitive/Communication Assessment functional   SLURRED.   -CD        Orientation Status oriented x 4  -CD        Follows Commands/Answers Questions 100% of the time  -CD        Personal Safety mild impairment;decreased awareness, need for assist;decreased awareness, need for safety;decreased insight to deficits  -CD        Personal Safety Interventions fall prevention program maintained;gait belt;muscle strengthening  facilitated;nonskid shoes/slippers when out of bed;supervised activity  -CD        ROM (Range of Motion)    General ROM no range of motion deficits identified   B LE'S   -CD        MMT (Manual Muscle Testing)    General MMT Assessment lower extremity strength deficits identified   R LE 4/5. L LE 5/5.   -CD        Muscle Tone Assessment    Muscle Tone Assessment  --  -SF --  -SF      Bed Mobility, Assessment/Treatment    Bed Mob, Supine to Sit, [place] independent  -CD        Transfer Assessment/Treatment    Transfers, Sit-Stand [place] contact guard assist  -CD        Transfers, Stand-Sit [place] contact guard assist  -CD        Transfers, Sit-Stand-Sit, Assist Device rolling walker  -CD        Transfer, Safety Issues step length decreased  -CD        Transfer, Impairments impaired balance;strength decreased;coordination impaired  -CD        Transfer, Comment CUES FOR HAND PLACEMENT.   -CD        Sensory Assessment/Intervention    Light Touch LLE;RLE   INTACT.   -CD        Positioning and Restraints    Pre-Treatment Position in bed  -CD        Post Treatment Position chair  -CD        In Chair sitting;call light within reach;notified nsg;encouraged to call for assist;with other staff;exit alarm on;with family/caregiver   DR MUIR PRESENT.   -CD          User Key  (r) = Recorded By, (t) = Taken By, (c) = Cosigned By    Initials Name Effective Dates    CD Rosanne Welsh, FRANCISCA 06/19/15 -     VENUS Isabel OT 06/22/15 -     PS Sole Orozco, RN 06/16/16 -     SF Ines Huynh, QUENTIN 09/26/17 -     SD Marlen Schafer, QUENTIN 06/23/17 -     MD Patricia Jaffe, Speech Therapy Student 03/07/18 -            Occupational Therapy Education     Title: PT OT SLP Therapies (Active)     Topic: Occupational Therapy (Active)     Point: ADL training (Done)    Description: Instruct learner(s) on proper safety adaptation and remediation techniques during self care or transfers.   Instruct in proper use of assistive  devices.    Learning Progress Summary    Learner Readiness Method Response Comment Documented by Status   Patient Acceptance E NANDINI LAWSON Discussed pt deficits and symptoms and OT POC. AN 03/07/18 1147 Done   Family Acceptance E NANDINI LAWSON Discussed pt deficits and symptoms and OT POC. AN 03/07/18 1147 Done                      User Key     Initials Effective Dates Name Provider Type Discipline    AN 06/22/15 -  Leslye Isabel, OT Occupational Therapist OT                  OT Recommendation and Plan  Anticipated Equipment Needs At Discharge:  (TBD)  Anticipated Discharge Disposition: home with outpatient services  Planned Therapy Interventions: activity intolerance, ADL retraining, IADL retraining, balance training, strengthening, transfer training  Therapy Frequency: daily  Plan of Care Review  Plan Of Care Reviewed With: patient, spouse  Outcome Summary/Follow up Plan: Pt currently with UE motor deficits and decrease in ADL I due to evolving symptoms. Pt currently needs supv with mobility and ADL's. Pt would benefit from outpt upon discharge home with spouse to address balance and I.          OT Goals       03/07/18 1148          Transfer Training OT LTG    Transfer Training OT LTG, Date Established 03/07/18  -AN      Transfer Training OT LTG, Time to Achieve 1 wk  -AN      Transfer Training OT LTG, Activity Type all transfers  -AN      Transfer Training OT LTG, [place] Level set up required  -AN      Dynamic Standing Balance OT LTG    Dynamic Standing Balance OT LTG, Date Established 03/07/18  -AN      Dynamic Standing Balance OT LTG, Time to Achieve 1 wk  -AN      Dynamic Standing Balance OT LTG, [place] Level supervision required  -AN      Dynamic Standing Balance OT LTG, Additional Goal during ADL's including reaching to LE's.  -AN      LB Dressing OT LTG    LB Dressing Goal OT LTG, Date Established 03/07/18  -AN      LB Dressing Goal OT LTG, Time to Achieve 1 wk  -AN      LB Dressing Goal OT LTG, [place]  Level set up required  -AN      Coordination OT LTG    Coordination OT LTG, Date Established 03/07/18  -AN      Coordination OT LTG, Time to Achieve 1 wk  -AN      Coordination OT LTG, Activity Type FM task;GM task  -AN      Coordination OT LTG, [place] Level standby assist  -AN      Coordination OT LTG, Additional Goal during ADL with Raúl hands  -AN        User Key  (r) = Recorded By, (t) = Taken By, (c) = Cosigned By    Initials Name Provider Type    AN Leslye Isabel OT Occupational Therapist                Outcome Measures       03/07/18 1100 03/07/18 0818       How much help from another person do you currently need...    Turning from your back to your side while in flat bed without using bedrails?  4  -CD     Moving from lying on back to sitting on the side of a flat bed without bedrails?  4  -CD     Moving to and from a bed to a chair (including a wheelchair)?  3  -CD     Standing up from a chair using your arms (e.g., wheelchair, bedside chair)?  3  -CD     Climbing 3-5 steps with a railing?  3  -CD     To walk in hospital room?  3  -CD     AM-PAC 6 Clicks Score  20  -CD     How much help from another is currently needed...    Putting on and taking off regular lower body clothing? 3  -AN      Bathing (including washing, rinsing, and drying) 3  -AN      Toileting (which includes using toilet bed pan or urinal) 4  -AN      Putting on and taking off regular upper body clothing 4  -AN      Taking care of personal grooming (such as brushing teeth) 4  -AN      Eating meals 4  -AN      Score 22  -AN      Modified [place] Scale    Modified Alea Scale 3 - Moderate disability.  Requiring some help, but able to walk without assistance.  -AN 3 - Moderate disability.  Requiring some help, but able to walk without assistance.  -CD     Functional Assessment    Outcome Measure Options AM-PAC 6 Clicks Daily Activity (OT);Modified [place]  -AN AM-PAC 6 Clicks Basic Mobility (PT);Modified [place]  -CD       User Key  (r) =  Recorded By, (t) = Taken By, (c) = Cosigned By    Initials Name Provider Type    CD Rosanne Welsh, PT Physical Therapist    VENUS Isabel, OT Occupational Therapist          Time Calculation:   OT Start Time: 1100    Therapy Charges for Today     Code Description Service Date Service Provider Modifiers Qty    [id number] HC OT EVAL LOW COMPLEXITY 4 3/7/2018 Leslye Isabel OT GO 1               Leslye Isabel OT  3/7/2018

## 2018-03-07 NOTE — H&P
[facility] Medicine Services  HISTORY AND PHYSICAL    Patient Name: Papo Ann  : 1940  MRN: [id number]  Primary Care Physician: Juan Manuel Chung MD    Subjective   Subjective     Chief Complaint:  Speech problems    HPI:  Papo Ann is a pleasant 77 y.o. male with PMH significant for CKD III, CAD with stent done by Dr. Dotson ([facility]) Dec 28, 2017 (and on Plavix), HTN, prostate treated by [employer] (in remission), and T2DM.  He has a 3 day history of stuttering, waxing/waning neurological deficits.  He fell due to his right foot dragging on the evening of 3/4/2018.  The following day, the patient's wife noticed speech deficits that would come and go as well as some changes in his handwriting.  Today, the has had some persistent speech issues.  He came to the ER for further evaluation.  MRI shows a faint acute to subacute left pontine infarction, glc 356, creatinine 1.6 (unknown baseline), and WBC 11.67K.     Review of Systems   Constitutional: Positive for activity change. Negative for chills and fever.   HENT: Negative.    Eyes: Negative.    Respiratory: Negative.    Cardiovascular: Negative.    Gastrointestinal: Negative.    Endocrine: Negative.    Genitourinary: Negative.    Musculoskeletal: Negative.    Skin:        Had lesion removed just superior to upper lip on right side yesterday.   Allergic/Immunologic: Negative.    Neurological: Positive for facial asymmetry, speech difficulty and weakness.   Hematological: Negative.    Psychiatric/Behavioral: Negative.           Otherwise 10-system ROS reviewed and is negative except as mentioned in the HPI.    Personal History     Past Medical History:   Diagnosis Date   • Chronic kidney disease     renal failure no dialysis   • Diverticula of colon    • Hypertension    • Kidney stone 2015   • Pneumothorax, spontaneous, tension 1970   • Prostate cancer    • Skin cancer       [employer] Pr[employer]   • Ulcer duodenal hemorrhage 1973       Past Surgical History:   Procedure Laterality Date   • COLONOSCOPY  2014    Dr. Chairez   • CORONARY STENT PLACEMENT  10/21/2003    Dr. Izaguirre   • CORONARY STENT PLACEMENT  01/2018    Stent to LAD   • ESOPHAGEAL DILATATION  2012    Dr. Weinstein polyps removed also   • HAND SURGERY Left 2008    Dr. Marty Sommers   • HAND SURGERY Right 2010    Dr. Lai   • HEMORRHOIDECTOMY  1976   • PROSTATE BIOPSY  03/03/2017    [facility]   • PROSTATE FIDUCIAL MARKER PLACEMENT  06/12/2017   • PROSTATE SURGERY  1999    turp Dr. Hampton       Family History: family history includes Alzheimer's disease in his mother; Heart disease in his mother; Heart failure in his father; Lung cancer in his mother; Prostate cancer in his son.     Social History:  reports that he quit smoking about 36 years ago. He smoked 3.00 packs per day. He has never used smokeless tobacco. He reports that he drinks alcohol. He reports that he does not use illicit drugs.  Social History     Social History Narrative       Medications:    (Not in a hospital admission)    Allergies   Allergen Reactions   • Ceclor [Cefaclor] Hives   • Codeine Hives   • Erythromycin Hives   • Flomax [Tamsulosin] Anaphylaxis   • Keflex [Cephalexin] Hives   • Other Hives     Potaba caps 500mg   • Penicillins Hives   • Sulfa Antibiotics Hives   • Adhesive Tape Other (See Comments)     blister   • Glucophage [Metformin] Diarrhea   • Hctz [Hydrochlorothiazide] Hives   • Lisinopril Cough       Objective   Objective     Vital Signs:   Temp:  [97.5 °F (36.4 °C)] 97.5 °F (36.4 °C)  Heart Rate:  [64-76] 64  Resp:  [22] 22  BP: (145-194)/(84-90) 164/85   Total (NIH Stroke Scale): 2    Physical Exam   Constitutional: No acute distress, awake, alert  Eyes: PERRLA, sclerae anicteric, no conjunctival injection  HENT: NCAT, mucous membranes moist  Neck: Supple, no thyromegaly, no lymphadenopathy, trachea midline  Respiratory: Clear to auscultation  bilaterally, nonlabored respirations   Cardiovascular: RRR, no murmurs, rubs, or gallops, palpable pedal pulses bilaterally  Gastrointestinal: Positive bowel sounds, soft, nontender, nondistended  Musculoskeletal: No bilateral ankle edema, no clubbing or cyanosis to extremities  Psychiatric: Appropriate affect, cooperative  Neurologic: Fine tremor, Oriented x 3, strength symmetric in all extremities, right facial droop and mild dysarthria  Skin: recent excision above right upper lip    Results Reviewed:  I have personally reviewed current lab, radiology, and data and agree.      Results from last 7 days  Lab Units 03/06/18  1606   WBC 10*3/mm3 11.67*   HEMOGLOBIN g/dL 15.0   HEMATOCRIT % 44.8   PLATELETS 10*3/mm3 274       Results from last 7 days  Lab Units 03/06/18  1606   SODIUM mmol/L 134   POTASSIUM mmol/L 4.4   CHLORIDE mmol/L 102   CO2 mmol/L 21.0   BUN mg/dL 30*   CREATININE mg/dL 1.60*   GLUCOSE mg/dL 356*   CALCIUM mg/dL 9.8   ALT (SGPT) U/L 41*   AST (SGOT) U/L 26     Estimated Creatinine Clearance: 41.2 mL/min (by C-G formula based on Cr of 1.6).  Brief Urine Lab Results  (Last result in the past 365 days)      Color   Clarity   Blood   Leuk Est   Nitrite   Protein   CREAT   Urine HCG        03/06/18 1714 Yellow Clear Large (3+)(A) Negative Negative Negative             No results found for: BNP  No results found for: PHART  Imaging Results (last 24 hours)     Procedure Component Value Units Date/Time    MRI Brain Without Contrast [[id number]] Collected:  03/06/18 1705     Updated:  03/06/18 1912    Addenda:        THIS REPORT CONTAINS FINDINGS THAT MAY BE CRITICAL TO PATIENT CARE. The   findings were verbally communicated via telephone conference with SHANNAN HERNANDEZ   at 7:11 PM EST on 3/6/2018. The findings were acknowledged and understood.    THIS DOCUMENT HAS BEEN ELECTRONICALLY SIGNED BY SAAD ADLER MD  Signed:  03/06/18 1912 by Saad Adler MD    Narrative:       EXAM:    MR Head Without  "Intravenous Contrast    CLINICAL HISTORY:    77 years old, male; Signs and symptoms; Weakness, extremity; Patient HX:   Stroke altered mental status x3 days. On 3/4/18 the wife noticed that the   patient was \"overall slow\" regarding his movement, thought processes, and   speech. Over the next few days, the wife noticed the patient was dragging his   right foot, had difficulty with handwriting, presence of word finding and new   onset generalized tremor. There is increased voice hoarseness accompanied by   intermittent slurred speech. The patient does state that he fell 2 days ago   down stairs with impact on his right wrist and right foot. There are no   complaints of right wrist pain or right foot pain or decreased rom from the   fall at this time. HX of prostate cancer    TECHNIQUE:    Magnetic resonance images of the head/brain without intravenous contrast in   multiple planes.    COMPARISON:    No relevant prior studies available.    FINDINGS:      Brain:  Faint acute to subacute left pontine infarction.  There are   periventricular and subcortical areas of flair high signal consistent with   chronic small vessel ischemic disease.  No evidence of hemorrhage or mass   effect.  The cortical sulci are enlarged consistent with cerebral atrophy.      Ventricles:  The ventricles are mildly enlarged.      Bones/joints:  Unremarkable.      Sinuses:  Unremarkable as visualized.      Mastoid air cells:  Unremarkable as visualized.  No mastoid effusion.      Orbits:  Unremarkable as visualized.      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    THIS DOCUMENT HAS BEEN ELECTRONICALLY SIGNED BY JEMMA PERALES MD             Assessment/Plan   Assessment / Plan     Hospital Problem List     Chronic kidney disease (Chronic)    Hypertension (Chronic)    Coronary artery disease involving native coronary artery of native heart without angina pectoris " (Chronic)    Overview Signed 3/6/2018  8:00 PM by Vane Cali MD     Stent to LAD January 2018         Cerebrovascular accident (CVA)            Assessment & Plan:  Mr. Ann is a pleasant 77 year old with recent right sided weakness and right facial droop as well as dysarthria.  His RUE and RLE weakness have improved.    Left pontine infarct, on 81 ASA and 71 plavix  --Increase ASA to 325, check plavix resistance panel  --ECHO and carotids  --Q4 hour neuro checks  --Neuro consultation  --A1C, blood sugar management  --Somewhat permissive HTN until carotid imaging complete  --High intensity lipitor  --PT/OT/CM  --NPO until speech eval      DVT prophylaxis: Mechanical    CODE STATUS:  No Order Full    INPATIENT status due to the need for care which can only be reasonably provided in an hospital setting such as aggressive/expedited ancillary services and consultation services, the necessity for IV medications, close physician monitoring.  In such, I feel patient’s risk for adverse outcomes and need for care warrant INPATIENT evaluation and predict the patient’s care encounter to likely last beyond 2 midnights.        Electronically signed by Vane Cali MD, 03/06/18, 8:49 PM.

## 2018-03-08 LAB — BACTERIA SPEC AEROBE CULT: NORMAL

## 2018-03-09 ENCOUNTER — HOSPITAL ENCOUNTER (OUTPATIENT)
Dept: PHYSICAL THERAPY | Facility: HOSPITAL | Age: 78
Setting detail: THERAPIES SERIES
Discharge: HOME | End: 2018-03-09
Attending: INTERNAL MEDICINE

## 2018-03-09 DIAGNOSIS — I63.9 CEREBROVASCULAR ACCIDENT (CVA), UNSPECIFIED MECHANISM (HCC): Primary | ICD-10-CM

## 2018-03-09 PROCEDURE — G8979 MOBILITY GOAL STATUS: HCPCS | Performed by: PHYSICAL THERAPIST

## 2018-03-09 PROCEDURE — G8978 MOBILITY CURRENT STATUS: HCPCS | Performed by: PHYSICAL THERAPIST

## 2018-03-09 PROCEDURE — 97161 PT EVAL LOW COMPLEX 20 MIN: CPT | Performed by: PHYSICAL THERAPIST

## 2018-03-09 NOTE — THERAPY EVALUATION
.Outpatient Physical Therapy Neuro Initial Evaluation  [facility]     Patient Name: Papo Ann  : 1940  MRN: [id number]  Today's Date: 3/9/2018      Visit Date: 2018    Patient Active Problem List   Diagnosis   • Prostate cancer   • Chronic kidney disease   • Hypertension   • Coronary artery disease involving native coronary artery of native heart without angina pectoris   • Cerebrovascular accident (CVA)   • Type 2 diabetes mellitus with complication, with long-term current use of insulin        Past Medical History:   Diagnosis Date   • Chronic kidney disease     renal failure no dialysis   • Diverticula of colon    • Hypertension    • Kidney stone 2015   • Pneumothorax, spontaneous, tension 1970   • Prostate cancer    • Skin cancer     Dr. [employer] rPater   • Ulcer duodenal hemorrhage         Past Surgical History:   Procedure Laterality Date   • COLONOSCOPY      Dr. Chairez   • CORONARY STENT PLACEMENT  10/21/2003    Dr. Izaguirre   • CORONARY STENT PLACEMENT  2018    Stent to LAD   • ESOPHAGEAL DILATATION      Dr. Weinstein polyps removed also   • HAND SURGERY Left     Dr. Marty Sommers   • HAND SURGERY Right     Dr. Lai   • HEMORRHOIDECTOMY     • PROSTATE BIOPSY  2017    [facility]   • PROSTATE FIDUCIAL MARKER PLACEMENT  2017   • PROSTATE SURGERY      turp Dr. Hampton         Visit Dx:     ICD-10-CM ICD-9-CM   1. Cerebrovascular accident (CVA), unspecified mechanism I63.9 434.91             Patient History       18 1445          History    Chief Complaint Balance Problems;Difficulty Walking;Difficulty with daily activities;Falls/history of falls;Muscle weakness  -      Date Current Problem(s) Began 18  -      Brief Description of Current Complaint Patient reports to clinic s/p CVA. On 3/4 he fell due to a R foot drag and speech difficulties, as well as changes in handwriting. His wife took him to ER on 3/6 where he was  diagnosed with ischemic stoke. He had uncontrolled HTN, DM, hyperlipidemia and small vessel disease. He states since being home he has had no difficulty with walking or balance. His main issues are with his changed speech.   -KL      Current Tobacco Use no  -KL      Pain     Pain at Present 0  -KL      Pain at Best 0  -KL      Pain at Worst 0  -KL      Fall Risk Assessment    Any falls in the past year: Yes  -KL      Number of falls reported in the last 12 months 2  -KL      Daily Activities    Primary Language English  -      Recommended Referrals Speech Therapy;Occupational Therapy  -      Pt Participated in POC and Goals Yes  -KL      Safety    Are you being hurt, hit, or frightened by anyone at home or in your life? No  -KL        User Key  (r) = Recorded By, (t) = Taken By, (c) = Cosigned By    Initials Name Provider Type    MARIA INES Sharpe, PT Physical Therapist                    PT Neuro       03/09/18 9347          Home Living    Living Arrangements condominium  -      Home Accessibility bed and bath are not on the first floor  -      Vision-Basic Assessment    Current Vision No visual deficits  -KL      Cognition    Overall Cognitive Status WFL  -KL      Sensation    Sensation WNL? WFL  -KL      ROM (Range of Motion)    General ROM no range of motion deficits identified  -KL      MMT (Manual Muscle Testing)    General MMT Assessment no strength deficits identified  -      General MMT Assessment Detail 5/5 throughout BLE's; noticably weaker  on R versus L   -KL      Bed Mobility, Assessment/Treatment    Bed Mobility, Comment Independent   -KL      Transfers    Transfer, Comment Independent   -KL      Gait Assessment/Treatment    Gait, Comment Normal gait pattern without AD   -KL      Stairs Assessment/Treatment    Stairs, Comment ascending and descending reciprocal   -KL        User Key  (r) = Recorded By, (t) = Taken By, (c) = Cosigned By    Initials Name Provider Type    MARIA INES BROOKS  FRANCISCA Sharpe Physical Therapist                             PT OP Goals       03/09/18 1559 03/09/18 1445    PT Short Term Goals    STG Date to Achieve  03/30/18  -    STG 1  Patient will report compliance with HEP.  -KL    STG 1 Progress  New  -KL    STG 2  Client to improve mini-BEST test balance score to >/= 224/28 to decrease client's risk of falls.  -KL    STG 2 Progress  New  -    Long Term Goals    LTG Date to Achieve  04/20/18  -    LTG 1  Patient will be I with HEP.   -KL    LTG 1 Progress  New  -KL    LTG 2  Client to improve mini-BEST test balance score to >/= 27/28 to decrease client's risk of falls.  -KL    LTG 2 Progress  New  -    Time Calculation    PT Goal Re-Cert Due Date 06/07/18  -KL 06/07/18  -      User Key  (r) = Recorded By, (t) = Taken By, (c) = Cosigned By    Initials Name Provider Type     Shital Sharpe, PT Physical Therapist                PT Assessment/Plan       03/09/18 1555       PT Assessment    Functional Limitations Limitation in home management;Limitations in community activities  -     Impairments Balance  -     Assessment Comments Patient is 77 YOM that presents s/p CVA. He demonstrates a slight decrease in dynamic and static balance, however gait is normal. Patient will require skilled PT intervention to address deficits in order to return to PLOF.   -     Please refer to paper survey for additional self-reported information Yes  -KL     Rehab Potential Good  -KL     Patient/caregiver participated in establishment of treatment plan and goals Yes  -     Patient would benefit from skilled therapy intervention Yes  -KL     PT Plan    PT Frequency 1x/week  -KL     Predicted Duration of Therapy Intervention (days/wks) 4 visits   -     Planned CPT's? PT EVAL LOW COMPLEXITY: 14868;PT THER PROC EA 15 MIN: 02947;PT GAIT TRAINING EA 15 MIN: 40018;PT NEUROMUSC RE-EDUCATION EA 15 MIN: 73866;PT MANUAL THERAPY EA 15 MIN: 10838;PT THER ACT EA 15 MIN: 94932  -KL      PT Plan Comments Patient will be seen 1x/wk x 4 wks with treatment to include strengthening, stretching, manual therapy, neuromuscular re-education, balance, gait and endurance training.   -MARIA INES       User Key  (r) = Recorded By, (t) = Taken By, (c) = Cosigned By    Initials Name Provider Type    MARIA INES Sharpe, PT Physical Therapist                             Outcome Measure Options: 10 Meter Walk, Timed Up and Go (TUG), FGA (Functional Gait Assessment), Mini-Best Test  10 Meter Walk Test Self-Selected Velocity  Self-Selected Velocity: Trial 1: 8.33 sec.  10 Meter Walk Test Fast Velocity  10 Meter Walk Fast Velocity: Trial 1: 7.4 sec.  Functional Gait Assessment (FGA)  Gait Level Surface: Mild Impairment  Change in Gait Speed: Normal  Gait with Horizontal Head Turns: Mild Impairment  Gait with Vertical Head Turns: Mild Impairment  Gait and Pivot Turn: Normal  Step Over Obstacle: Normal  Gait with Narrow Base of Support: Severe Impairment  Gait with Eyes Closed: Mild Impairment  Ambulating Backwards: Mild Impairment  Steps: Mild Impairment  FGA Total Score: 21  Mini Best  Mini Best Score/Comments: 21/28  Timed Up and Go (TUG)  TUG Test 1: 8.4 seconds    Time Calculation:   Start Time: 1445     Therapy Charges for Today     Code Description Service Date Service Provider Modifiers Qty    14870562798 HC PT MOBILITY CURRENT 3/9/2018 Shital Sharpe, PT GP, CJ 1    76139690805 HC PT MOBILITY PROJECTED 3/9/2018 Shital Sharpe, PT GP, CI 1    89101453211 HC PT EVAL LOW COMPLEXITY 4 3/9/2018 Shital Sharpe, PT GP 1          PT G-Codes  PT Professional Judgement Used?: Yes  Outcome Measure Options: 10 Meter Walk, Timed Up and Go (TUG), FGA (Functional Gait Assessment), Mini-Best Test  Functional Limitation: Mobility: Walking and moving around  Mobility: Walking and Moving Around Current Status (): At least 20 percent but less than 40 percent impaired, limited or restricted  Mobility: Walking and Moving  Around Goal Status (): At least 1 percent but less than 20 percent impaired, limited or restricted         Shital Sharpe, PT  3/9/2018

## 2018-03-12 ENCOUNTER — TELEPHONE (OUTPATIENT)
Dept: SOCIAL WORK | Facility: HOSPITAL | Age: 78
End: 2018-03-12

## 2018-03-12 NOTE — TELEPHONE ENCOUNTER
SW called pt to address recent distress screening score of 5.  SW left a message asking pt to call back at his convenience.  SW available for ongoing support and resource needs.

## 2018-03-16 ENCOUNTER — HOSPITAL ENCOUNTER (OUTPATIENT)
Dept: PHYSICAL THERAPY | Facility: HOSPITAL | Age: 78
Setting detail: THERAPIES SERIES
Discharge: HOME | End: 2018-03-16
Attending: INTERNAL MEDICINE

## 2018-03-16 DIAGNOSIS — I63.9 CEREBROVASCULAR ACCIDENT (CVA), UNSPECIFIED MECHANISM (HCC): Primary | ICD-10-CM

## 2018-03-16 PROCEDURE — 97110 THERAPEUTIC EXERCISES: CPT | Performed by: PHYSICAL THERAPIST

## 2018-03-16 PROCEDURE — 97112 NEUROMUSCULAR REEDUCATION: CPT | Performed by: PHYSICAL THERAPIST

## 2018-03-16 NOTE — THERAPY TREATMENT NOTE
Outpatient Physical Therapy Neuro Treatment Note  [facility]     Patient Name: Papo Ann  : 1940  MRN: [id number]  Today's Date: 3/16/2018      Visit Date: 2018    Visit Dx:    ICD-10-CM ICD-9-CM   1. Cerebrovascular accident (CVA), unspecified mechanism I63.9 434.91       Patient Active Problem List   Diagnosis   • Prostate cancer   • Chronic kidney disease   • Hypertension   • Coronary artery disease involving native coronary artery of native heart without angina pectoris   • Cerebrovascular accident (CVA)   • Type 2 diabetes mellitus with complication, with long-term current use of insulin                 PT Neuro     Row Name 18 5339             Balance Skills Training    Training Strategies (Balance) In // bars: HEP for balance - feet together eyes closed, modified tandem holding and with eyes closed, tandem stance, SLS   -KL        User Key  (r) = Recorded By, (t) = Taken By, (c) = Cosigned By    Initials Name Provider Type    MARIA INES Sharpe, PT Physical Therapist                        PT Assessment/Plan     Row Name 18 2632          PT Assessment    Assessment Comments Patient demonstrates good performance of exercises without LOB. He will perform his balance HEP daily. OT and Speech referrals have been faxed and those will be scheduled.   -KL        PT Plan    PT Plan Comments Continue per POC.   -KL       User Key  (r) = Recorded By, (t) = Taken By, (c) = Cosigned By    Initials Name Provider Type    MARIA INES Sharpe, FRANCISCA Physical Therapist                     Exercises     Row Name 18 1500             Subjective Pain    Pre-Treatment Pain Level 0  -KL      Post-Treatment Pain Level 0  -KL         Exercise 1    Exercise Name 1 Nu-Step L6  -KL      Time 1 7  -KL        User Key  (r) = Recorded By, (t) = Taken By, (c) = Cosigned By    Initials Name Provider Type    MARIA INES Sharpe, PT Physical Therapist                            Therapy  Education  Given: HEP  Program: New  How Provided: Verbal, Demonstration, Written  Provided to: Patient  Level of Understanding: Verbalized, Demonstrated              Time Calculation:   Start Time: 1515  Total Timed Code Minutes- PT: 30 minute(s)     Therapy Charges for Today     Code Description Service Date Service Provider Modifiers Qty    71316236855  PT THER PROC EA 15 MIN 3/16/2018 Shital Sharpe, PT GP 1    [id number]  PT NEUROMUSC RE EDUCATION EA 15 MIN 3/16/2018 Shital Sharpe, PT GP 1                    Shital Sharpe, PT  3/16/2018

## 2018-04-05 ENCOUNTER — HOSPITAL ENCOUNTER (OUTPATIENT)
Dept: SPEECH THERAPY | Facility: HOSPITAL | Age: 78
Setting detail: THERAPIES SERIES
Discharge: HOME | End: 2018-04-05
Attending: INTERNAL MEDICINE

## 2018-04-05 DIAGNOSIS — I63.9 CEREBROVASCULAR ACCIDENT (CVA), UNSPECIFIED MECHANISM (HCC): Primary | ICD-10-CM

## 2018-04-05 PROCEDURE — G9186 MOTOR SPEECH GOAL STATUS: HCPCS

## 2018-04-05 PROCEDURE — 92523 SPEECH SOUND LANG COMPREHEN: CPT

## 2018-04-05 PROCEDURE — G8999 MOTOR SPEECH CURRENT STATUS: HCPCS

## 2018-04-05 NOTE — THERAPY EVALUATION
Outpatient Speech Language Pathology   Adult Speech Language Cognitive Initial Evaluation  [facility]     Patient Name: Papo Ann  : 1940  MRN: [id number]  Today's Date: 2018        Visit Date: 2018   Patient Active Problem List   Diagnosis   • Prostate cancer   • Chronic kidney disease   • Hypertension   • Coronary artery disease involving native coronary artery of native heart without angina pectoris   • Cerebrovascular accident (CVA)   • Type 2 diabetes mellitus with complication, with long-term current use of insulin        Past Medical History:   Diagnosis Date   • Chronic kidney disease     renal failure no dialysis   • Diverticula of colon    • Hypertension    • Kidney stone 2015   • Pneumothorax, spontaneous, tension 1970   • Prostate cancer    • Skin cancer     Dr. Cisco Prater   • Ulcer duodenal hemorrhage         Past Surgical History:   Procedure Laterality Date   • COLONOSCOPY      Dr. Chairez   • CORONARY STENT PLACEMENT  10/21/2003    Dr. Izaguirre   • CORONARY STENT PLACEMENT  2018    Stent to LAD   • ESOPHAGEAL DILATATION      Dr. Weinstein polyps removed also   • HAND SURGERY Left     Dr. Marty Sommers   • HAND SURGERY Right     Dr. aLi   • HEMORRHOIDECTOMY     • PROSTATE BIOPSY  2017    Memo   • PROSTATE FIDUCIAL MARKER PLACEMENT  2017   • PROSTATE SURGERY      turp Dr. Hampton         Visit Dx:    ICD-10-CM ICD-9-CM   1. Cerebrovascular accident (CVA), unspecified mechanism I63.9 434.91                 SLP SLC Evaluation - 18 1500        Communication Assessment/Intervention    Document Type evaluation  -HG    Subjective Information no complaints  -HG    Patient Observations alert;cooperative;agree to therapy  -HG    Patient Effort excellent  -HG    Symptoms Noted During/After Treatment none  -HG       General Information    Patient Profile Reviewed yes  -HG    Pertinent History Of Current Problem Papo  "Seth is a 77 year old white male presenting with altered mental status 3 days prior to acute care admit on 3/6/18. On 3/4/18 the wife noticed that the patient was \"overall slow\" regarding his movement, thought processes, and speech. Over the next few days, the wife noticed the patient was dragging his right foot, had difficulty with handwriting, presence of word finding and new onset generalized tremor. There is increased voice hoarseness accompanied by intermittent slurred speech. The patient does state that he fell 2 days ago down stairs with impact on his right wrist and right foot. There are no complaints of right wrist pain or right foot pain or decreased ROM from the fall at this time.   -HG    Precautions/Limitations, Vision WFL with corrective lenses  -HG    Precautions/Limitations, Hearing WFL  -HG    Prior Level of Function-Communication WFL  -    Plans/Goals Discussed with patient;spouse/S.O.  -    Barriers to Rehab none identified  -    Patient's Goals for Discharge return to all previous roles/activities;functional communication  -    Family Goals for Discharge patient able to return to previous activities/roles;functional communication  -    Standardized Assessment Used other (see comments)   Collaborative Speech and Language Evaluation  -       Auditory Comprehension Assessment/Intervention    Auditory Comprehension (Communication) WFL  -HG    Able to Identify Objects/Pictures (Communication) WFL  -    Answers Questions (Communication) yes/no;wh questions;personal;simple;concrete;WFL  -HG    Able to Follow Commands (Communication) 3-step;multi-step;mild impairment;moderate impairment  -    Narrative Discourse conversational level;mild impairment  -    Successful Auditory Strategies (Communication) repetition  -HG       Reading Comprehension Assessment/Intervention    Reading Comprehension (Communication) WFL  -    Scanning (Reading) sentences;WFL  -    Visual Matching Ability " (Reading) WFL  -HG    Single Word Level WFL  -HG    Phrase Level WFL  -HG    Paragraph Level mild impairment  -HG       Verbal Expression Assessment/Intervention    Verbal Expression mild impairment;moderate impairment  -HG    Automatic Speech (Communication) WFL  -HG    Repetition WFL  -HG    Phrase Completion automatic/predictable;mild impairment  -HG    Responsive Naming WFL  -HG    Confrontational Naming WFL  -HG    Spontaneous/Functional Words simple;WFL  -HG    Sentence Formulation complex;mild impairment;moderate impairment  -HG    Conversational Discourse/Fluency mild impairment;moderate impairment  -HG       Graphic Expression Assessment/Intervention    Graphic Expression mild impairment;moderate impairment  -HG    Graphic Expression to Dictation words;phrases;sentences;mild impairment;moderate impairment  -HG    Biographical Information name;telephone number;address;;WFL  -HG    Functional Correspondence moderate impairment  -HG       Oral Motor Structure and Function    Oral Motor Structure and Function WFL  -HG       Motor Speech Assessment/Intervention    Motor Speech Function mild impairment;moderate impairment  -HG    Characteristics Consistent with Dysarthria decreased articulation;increased rate;decreased breath support;slurred speech  -HG    Phase Completion mild impairment  -HG    Conversational Speech (Communication) mild impairment;moderate impairment  -HG      User Key  (r) = Recorded By, (t) = Taken By, (c) = Cosigned By    Initials Name Provider Type     Savannah Mccann MS [facility]-SLP Speech and Language Pathologist                               OP SLP Education     Row Name 18 1500       Education    Barriers to Learning No barriers identified  -    Education Provided Described results of evaluation;Patient expressed understanding of evaluation;Family/caregivers expressed understanding of evaluation;Patient participated in establishing goals and treatment plan;Family/caregivers  participated in establishing goals and treatment plan;Patient demonstrated recommended strategies;Family/caregivers demonstrated recommended strategies;Patient requires further education on strategies, risks;Family/caregivers require further education on strategies, risks  -    Assessed Learning needs;Learning motivation;Learning preferences;Learning readiness  -    Learning Motivation Strong  -    Learning Method Explanation;Demonstration;Teach back;Written materials  -    Teaching Response Verbalized understanding;Demonstrated understanding;Reinforcement needed  -    Education Comments Pt given oral motor exercises as well as homework for word retreival strategies.   -      User Key  (r) = Recorded By, (t) = Taken By, (c) = Cosigned By    Initials Name Effective Dates     Savannah GRAVES Siobhan, MS [facility]-SLP 06/22/15 -                 SLP OP Goals     Row Name 04/05/18 1500          Goal Type Needed    Goal Type Needed Verbal Expression;Written Language Expression;Dysarthria  -        Subjective Comments    Subjective Comments Pt alert, cooperative, accompanied with his wife who recognizes deficits that pt does not see or hear.  -        Written Language Expression Goals    Patient will be able to use written expressive language skills to communicate effectively in social/avocational/work setting 90%:;with cues  -HG     Status: Patient will be able to use written expressive language skills to communicate effectively in social/avocational/work setting New  -HG     Patient will improve written language skills by writing dictated short words 90%:;with cues  -HG     Status: Patient will improve written language skills by writing dictated short words New  -HG     Patient will improve written language skills by writing name of pictures 90%:;with cues  -HG     Status: Patient will improve written language skills by writing name of pictures New  -HG     Patient will improve written language skills by completing  printed phrases/sentences 80%:;with cues  -HG     Status: Patient will improve written language skills by completing printed phrases/sentences New  -HG     Patient will improve written language skills by writing verb phrase to describe a picture 80%:;with cues  -HG     Status: Patient will improve written language skills by writing verb phrase to describe a picture New  -HG        Verbal Expression Goals    Patient will be able to use verbal expressive language skills to communicate effectively in all situations with unfamiliar listener 90%:;with cues  -HG     Status: Patient will be able to use verbal expressive language skills to communicate effectively in all situations with unfamiliar listener New  -HG     Patient will improve verbal expressive language skills by completing open-ended structured phrases/sentences 80%:;with cues  -HG     Status: Patient will improve verbal expressive language skills by completing open-ended structured phrases/sentences New  -HG     Patient will improve verbal expressive language skills by generating a sentence when given a key word 80%:;with cues  -HG     Status: Patient will improve verbal expressive language skills by generating a sentence when given a key word New  -HG     Patient will improve verbal expressive language skills by completing divergent naming tasks 80%:;with cues  -HG     Status: Patient will improve verbal expressive language skills by completing divergent naming tasks New  -HG     Patient will improve verbal expressive language skills by forming grammatically correct sentences 80%:;with cues  -HG        Dysarthria Goals    Patient will be able to engage in speech at the conversational level with maximum articulatory accuracy so that speech is understood by familiar /unfamiliar listeners 90%:;with cues  -HG     Status: Patient will be able to engage in speech at the conversational level with maximum articulatory accuracy so that speech is understood by familiar  /unfamiliar listeners New  -HG     Patient will improve comprehensibility of verbal messages by speaking at an appropriate vocal intensity 80%:;with cues  -HG     Status: Patient will improve comprehensibility of verbal messages by speaking at an appropriate vocal intensity New  -HG     Patient will maximize use of phonation to improve communication skills by using high phonatory effort in sentences 80%:;with cues  -HG     Status: Patient will maximize use of phonation to improve communication skills by using high phonatory effort in sentences New  -HG        SLP Time Calculation    SLP Goal Re-Cert Due Date 05/05/18  -HG       User Key  (r) = Recorded By, (t) = Taken By, (c) = Cosigned By    Initials Name Provider Type     Savannah Mccann MS CCC-SLP Speech and Language Pathologist                OP SLP Assessment/Plan - 04/05/18 1500        SLP Assessment    Functional Problems Speech Language- Adult/Cognition  -    Impact on Function: Adult Speech Language/Cognition Difficulty communicating wants, needs, and ideas;Difficulty sequencing thoughts to express complex messages  -    Clinical Impression: Speech Language-Adult/Congnition Mild-Moderate:;Expressive Aphasia;Dysarthria- Flaccid  -HG    Functional Problems Comment Pt presents with slurred speech Identified by pt and his wife and pt admits to talking to fast and that he can't say words like he used to but feels he is improving.  -HG    Clinical Impression Comments Pt presents with expresive aphasia and flaccid dysarthria following his CVA on 3/6/18.   -HG    Please refer to paper survey for additional self-reported information Yes  -HG    Please refer to items scanned into chart for additional diagnostic informaiton and handouts as provided by clinician Yes  -HG    SLP Diagnosis Mild to Moderate Expressive Aphasia; Flaccid Dysarthria  -    Prognosis Excellent (comment)  -HG    Patient/caregiver participated in establishment of treatment plan and  goals Yes  -    Patient would benefit from skilled therapy intervention Yes  -HG       SLP Plan    Frequency 2x/week  -HG    Duration 8 weeks  -HG    Planned CPT's? SLP SPEECH & LANGUAGE EVAL: 07199;SLP INDIVIDUAL SPEECH THERAPY: 37507  -HG    Expected Duration Therapy Session - minutes 45-60 minutes  -HG    Plan Comments Initiate Language tx.   -HG      User Key  (r) = Recorded By, (t) = Taken By, (c) = Cosigned By    Initials Name Provider Type     Savannah Mccann MS CCC-SLP Speech and Language Pathologist                 Time Calculation:   SLP Start Time: 1500    Therapy Charges for Today     Code Description Service Date Service Provider Modifiers Qty    88277631786 HC ST MOTOR SPEECH CURRENT 4/5/2018 Savannah Mccann MS CCC-SLP GN,  1    88932032957 HC ST MOTOR SPEECH GOAL STATUS 4/5/2018 Savannah Mccann MS CCC-SLP GN,  1    24622827534 HC ST EVAL SPEECH AND PROD W LANG  6 4/5/2018 Savannah Mccann MS CCC-SLP GN 1          SLP G-Codes  Functional Limitations: Spoken language expressive, Motor speech  Motor Speech Current Status (): At least 20 percent but less than 40 percent impaired, limited or restricted  Motor Speech Goal Status (): 0 percent impaired, limited or restricted        Savannah Mccann MS CCC-SLP  4/5/2018

## 2018-04-17 ENCOUNTER — OFFICE VISIT (OUTPATIENT)
Dept: NEUROLOGY | Facility: CLINIC | Age: 78
End: 2018-04-17

## 2018-04-17 VITALS
HEIGHT: 67 IN | BODY MASS INDEX: 30.92 KG/M2 | SYSTOLIC BLOOD PRESSURE: 130 MMHG | DIASTOLIC BLOOD PRESSURE: 64 MMHG | WEIGHT: 197 LBS

## 2018-04-17 DIAGNOSIS — I63.9 CEREBROVASCULAR ACCIDENT (CVA), UNSPECIFIED MECHANISM (HCC): Primary | ICD-10-CM

## 2018-04-17 PROCEDURE — 99215 OFFICE O/P EST HI 40 MIN: CPT | Performed by: PHYSICIAN ASSISTANT

## 2018-04-17 NOTE — PROGRESS NOTES
"Subjective     Chief Complaint: stroke     History of Present Illness   Papo Ann is a 77 y.o. male who comes to clinic today following a hospitalization at [facility] in 3/18 for stroke. He and his family noted fluctuating confusion, right sided weakness and slurred speech for several days prior to his admission. His wife also notes that his handwriting was illegible at this time. He denies any additional associated neurologic symptoms. An MRI of the brain was notable for an acute to subacute left pontine infarct. An echo and carotids were unremarkable. He was subsequently treated with ASA 81mg, Plavix, and Lipitor 80mg daily. Since his hospitalization, he notes that his symptoms have significantly improved, though have not resolved. He is currently working with SLP. He was recently discharged from .       I have reviewed and confirmed the past family, social and medical history as accurate on 4/17/18.     Review of Systems   Constitutional: Negative.    HENT: Negative.    Eyes: Negative.    Respiratory: Negative.    Cardiovascular: Negative.    Gastrointestinal: Negative.    Endocrine: Negative.    Genitourinary: Negative.    Musculoskeletal: Negative.    Skin: Negative.    Allergic/Immunologic: Negative.    Hematological: Negative.    Psychiatric/Behavioral: Negative.        Objective     /64   Ht 170.2 cm (67\")   Wt 89.4 kg (197 lb)   BMI 30.85 kg/m²     General appearance today is normal.       Physical Exam   Neurological: He has normal strength. He has a normal Finger-Nose-Finger Test. Gait normal.   Reflex Scores:       Tricep reflexes are 1+ on the right side and 1+ on the left side.       Bicep reflexes are 1+ on the right side and 1+ on the left side.       Brachioradialis reflexes are 1+ on the right side and 1+ on the left side.       Patellar reflexes are 1+ on the right side and 1+ on the left side.  Psychiatric: His speech is normal.        Neurologic Exam     Mental Status   Speech: speech " is normal   Level of consciousness: alert  Normal comprehension.     Cranial Nerves   Cranial nerves II through XII intact.     Motor Exam   Muscle bulk: normal  Overall muscle tone: normal    Strength   Strength 5/5 throughout.     Sensory Exam   Light touch normal.     Gait, Coordination, and Reflexes     Gait  Gait: normal    Coordination   Finger to nose coordination: normal    Tremor   Resting tremor: absent    Reflexes   Right brachioradialis: 1+  Left brachioradialis: 1+  Right biceps: 1+  Left biceps: 1+  Right triceps: 1+  Left triceps: 1+  Right patellar: 1+  Left patellar: 1+          Assessment/Plan   Papo was seen today for stroke.    Diagnoses and all orders for this visit:    Cerebrovascular accident (CVA), unspecified mechanism          Discussion/Summary   Papo Ann comes to clinic today with a history of stroke. His workup has been complete and appropriate. Therefore, I do not have any further recommendations concerning this. After discussing potential treatment options, it was elected to continue on ASA, Plavix, and Lipitor unchanged. He will continue to work closely with SLP. He will then follow up in our clinic on an as needed basis.      I spent 35 minutes out of 45 minutes face to face with the patient and family and discussing diagnosis, prognosis, diagnostic testing, evaluation, current status, treatment options and management.    As part of this visit I reviewed radiology results, obtained additional history from the family which is incorporated in the HPI and reviewed records from prior hospitalizations which is incorporated in the HPI.      Deborah Decker PA-C

## 2018-04-24 ENCOUNTER — HOSPITAL ENCOUNTER (OUTPATIENT)
Dept: SPEECH THERAPY | Facility: HOSPITAL | Age: 78
Discharge: HOME | End: 2018-04-24
Admitting: INTERNAL MEDICINE

## 2018-04-24 DIAGNOSIS — I63.9 CEREBROVASCULAR ACCIDENT (CVA), UNSPECIFIED MECHANISM (HCC): Primary | ICD-10-CM

## 2018-04-24 PROCEDURE — 92507 TX SP LANG VOICE COMM INDIV: CPT

## 2018-04-24 NOTE — THERAPY TREATMENT NOTE
Outpatient Speech Language Pathology   Adult Speech Language Cognitive Treatment Note  [facility]     Patient Name: Papo Ann  : 1940  MRN: [id number]  Today's Date: 2018         Visit Date: 2018   Patient Active Problem List   Diagnosis   • Prostate cancer   • Chronic kidney disease   • Hypertension   • Coronary artery disease involving native coronary artery of native heart without angina pectoris   • Cerebrovascular accident (CVA)   • Type 2 diabetes mellitus with complication, with long-term current use of insulin          Visit Dx:    ICD-10-CM ICD-9-CM   1. Cerebrovascular accident (CVA), unspecified mechanism I63.9 434.91                               SLP OP Goals     Row Name 18 1400          Goal Type Needed    Goal Type Needed Verbal Expression;Written Language Expression;Dysarthria  -HG        Subjective Comments    Subjective Comments Pt alert, cooperative, accompanied with wife.   -HG        Written Language Expression Goals    Patient will be able to use written expressive language skills to communicate effectively in social/avocational/work setting 90%:;with cues  -HG     Status: Patient will be able to use written expressive language skills to communicate effectively in social/avocational/work setting New  -HG     Patient will improve written language skills by writing dictated short words 90%:;with cues  -HG     Status: Patient will improve written language skills by writing dictated short words Progressing as expected  -HG     Comments: Patient will improve written language skills by writing dictated short words 18: Pt answered wh questions with one word written answers and pt was 80% accurate.  -HG     Patient will improve written language skills by writing name of pictures 90%:;with cues  -HG     Status: Patient will improve written language skills by writing name of pictures Progressing as expected  -HG     Comments: Patient will improve written language  skills by writing name of pictures 4/24/18:    -HG     Patient will improve written language skills by completing printed phrases/sentences 80%:;with cues  -HG     Status: Patient will improve written language skills by completing printed phrases/sentences Progressing as expected  -HG     Comments: Patient will improve written language skills by completing printed phrases/sentences 4/24/18: Predictable Sentences: pt was 100% accurate.   -HG     Patient will improve written language skills by writing verb phrase to describe a picture 80%:;with cues  -HG     Status: Patient will improve written language skills by writing verb phrase to describe a picture Progressing as expected  -HG     Comments: Patient will improve written language skills by writing verb phrase to describe a picture 4/24/18: Pt was able to produce 5 sentences about the [employer] Picture.   -HG        Verbal Expression Goals    Patient will be able to use verbal expressive language skills to communicate effectively in all situations with unfamiliar listener 90%:;with cues  -HG     Status: Patient will be able to use verbal expressive language skills to communicate effectively in all situations with unfamiliar listener Progressing as expected  -HG     Comments: Patient will be able to use verbal expressive language skills to communicate effectively in all situations with unfamiliar listener 4/24/18: pt's wife states that he gets better and better everyday.  -HG     Patient will improve verbal expressive language skills by completing open-ended structured phrases/sentences 80%:;with cues  -HG     Status: Patient will improve verbal expressive language skills by completing open-ended structured phrases/sentences New;Progressing as expected  -HG     Comments: Patient will improve verbal expressive language skills by completing open-ended structured phrases/sentences 4/24/18: pt was 100% accurate for semi-predictable sentences.   -HG     Patient will  improve verbal expressive language skills by generating a sentence when given a key word 80%:;with cues  -HG     Status: Patient will improve verbal expressive language skills by generating a sentence when given a key word New;Progressing as expected  -HG     Comments: Patient will improve verbal expressive language skills by generating a sentence when given a key word 4/24/18: Pt was 80% accurate.   -HG     Patient will improve verbal expressive language skills by completing divergent naming tasks 80%:;with cues  -HG     Status: Patient will improve verbal expressive language skills by completing divergent naming tasks Progressing as expected  -HG     Comments: Patient will improve verbal expressive language skills by completing divergent naming tasks 4/24/18:   -HG     Patient will improve verbal expressive language skills by forming grammatically correct sentences 80%:;with cues  -HG        Dysarthria Goals    Patient will be able to engage in speech at the conversational level with maximum articulatory accuracy so that speech is understood by familiar /unfamiliar listeners 90%:;with cues  -HG     Status: Patient will be able to engage in speech at the conversational level with maximum articulatory accuracy so that speech is understood by familiar /unfamiliar listeners Progressing as expected  -HG     Comments: Patient will be able to engage in speech at the conversational level with maximum articulatory accuracy so that speech is understood by familiar /unfamiliar listeners 4/24/18: Pt is at least 90% intelligible.   -HG     Patient will improve comprehensibility of verbal messages by speaking at an appropriate vocal intensity 80%:;with cues  -HG     Status: Patient will improve comprehensibility of verbal messages by speaking at an appropriate vocal intensity New  -HG     Patient will maximize use of phonation to improve communication skills by using high phonatory effort in sentences 80%:;with cues  -HG      Status: Patient will maximize use of phonation to improve communication skills by using high phonatory effort in sentences Progressing as expected  -HG     Comments: Patient will maximize use of phonation to improve communication skills by using high phonatory effort in sentences 4/24/18: Pt's vocal intensity changes only by the end of the day and that is improving.   -        SLP Time Calculation    SLP Goal Re-Cert Due Date 05/05/18  -HG       User Key  (r) = Recorded By, (t) = Taken By, (c) = Cosigned By    Initials Name Provider Type    JOSY Mccann MS CCC-SLP Speech and Language Pathologist                OP SLP Education     Row Name 04/24/18 1400       Education    Education Comments Pt given homework for written expression.   -      User Key  (r) = Recorded By, (t) = Taken By, (c) = Cosigned By    Initials Name Effective Dates    JOSY MS SEDRICK KasperSLP 06/22/15 -                 OP SLP Assessment/Plan - 04/24/18 1400        SLP Plan    Plan Comments Cont with Language tx.   -      User Key  (r) = Recorded By, (t) = Taken By, (c) = Cosigned By    Initials Name Provider Type    JOSY Mccann MS CCC-SLP Speech and Language Pathologist                 Time Calculation:   SLP Start Time: 1400    Therapy Charges for Today     Code Description Service Date Service Provider Modifiers Qty    [id number]  ST TREATMENT SPEECH 4 4/24/2018 Savannah Mccann MS CCC-SLP GN 1                   Savannah Mccann MS CCC-SLP  4/24/2018

## 2018-05-03 ENCOUNTER — HOSPITAL ENCOUNTER (OUTPATIENT)
Dept: SPEECH THERAPY | Facility: HOSPITAL | Age: 78
Setting detail: THERAPIES SERIES
Discharge: HOME | End: 2018-05-03
Attending: INTERNAL MEDICINE

## 2018-05-03 DIAGNOSIS — I63.9 CEREBROVASCULAR ACCIDENT (CVA), UNSPECIFIED MECHANISM (HCC): Primary | ICD-10-CM

## 2018-05-03 PROCEDURE — G8999 MOTOR SPEECH CURRENT STATUS: HCPCS

## 2018-05-03 PROCEDURE — 92507 TX SP LANG VOICE COMM INDIV: CPT

## 2018-05-03 PROCEDURE — G9186 MOTOR SPEECH GOAL STATUS: HCPCS

## 2018-05-03 NOTE — THERAPY PROGRESS REPORT/RE-CERT
"Outpatient Speech Language Pathology   Adult Speech Language Cognitive Progress Note  [facility]     Patient Name: Papo Ann  : 1940  MRN: [id number]  Today's Date: 5/3/2018         Visit Date: 2018   Patient Active Problem List   Diagnosis   • Prostate cancer   • Chronic kidney disease   • Hypertension   • Coronary artery disease involving native coronary artery of native heart without angina pectoris   • Cerebrovascular accident (CVA)   • Type 2 diabetes mellitus with complication, with long-term current use of insulin          Visit Dx:    ICD-10-CM ICD-9-CM   1. Cerebrovascular accident (CVA), unspecified mechanism I63.9 434.91                               SLP OP Goals     Row Name 18 1500          Goal Type Needed    Goal Type Needed Verbal Expression;Written Language Expression;Dysarthria  -HG        Subjective Comments    Subjective Comments Pt alert, cooperative, feeling sore today but feels his speech is \"good\"!   -HG        Written Language Expression Goals    Patient will be able to use written expressive language skills to communicate effectively in social/avocational/work setting 90%:;with cues  -HG     Status: Patient will be able to use written expressive language skills to communicate effectively in social/avocational/work setting Progressing as expected  -HG     Comments: Patient will be able to use written expressive language skills to communicate effectively in social/avocational/work setting 5/3/18: For written expression homework, pt is completing work with 80% accuracy.   -HG     Patient will improve written language skills by writing dictated short words 90%:;with cues  -HG     Status: Patient will improve written language skills by writing dictated short words Progressing as expected  -HG     Comments: Patient will improve written language skills by writing dictated short words 18: Pt answered wh questions with one word written answers and pt was 80% accurate.  " -HG     Patient will improve written language skills by writing name of pictures 90%:;with cues  -HG     Status: Patient will improve written language skills by writing name of pictures Progressing as expected  -HG     Comments: Patient will improve written language skills by writing name of pictures --  -HG     Patient will improve written language skills by completing printed phrases/sentences 80%:;with cues  -HG     Status: Patient will improve written language skills by completing printed phrases/sentences Progressing as expected  -HG     Comments: Patient will improve written language skills by completing printed phrases/sentences 5/3/18: Predictable Sentences: pt was 80% accurate. 4/24/18: Predictable Sentences: pt was 100% accurate.   -HG     Patient will improve written language skills by writing verb phrase to describe a picture 80%:;with cues  -HG     Status: Patient will improve written language skills by writing verb phrase to describe a picture Progressing as expected  -HG     Comments: Patient will improve written language skills by writing verb phrase to describe a picture 5/3/18: Producing sentences when given a target word: pt was 100% accurate. 4/24/18: Pt was able to produce 5 sentences about the Cookie Theft Picture.   -HG        Verbal Expression Goals    Patient will be able to use verbal expressive language skills to communicate effectively in all situations with unfamiliar listener 90%:;with cues  -HG     Status: Patient will be able to use verbal expressive language skills to communicate effectively in all situations with unfamiliar listener Progressing as expected  -HG     Comments: Patient will be able to use verbal expressive language skills to communicate effectively in all situations with unfamiliar listener 5/3/18: Pt states that his wife is the nearly the only person that doesn't understand him at times. 4/24/18: pt's wife states that he gets better and better everyday.  -HG      Patient will improve verbal expressive language skills by completing open-ended structured phrases/sentences 80%:;with cues  -HG     Status: Patient will improve verbal expressive language skills by completing open-ended structured phrases/sentences New;Progressing as expected  -HG     Comments: Patient will improve verbal expressive language skills by completing open-ended structured phrases/sentences 4/24/18: pt was 100% accurate for semi-predictable sentences.   -HG     Patient will improve verbal expressive language skills by generating a sentence when given a key word 80%:;with cues  -HG     Status: Patient will improve verbal expressive language skills by generating a sentence when given a key word New;Progressing as expected  -HG     Comments: Patient will improve verbal expressive language skills by generating a sentence when given a key word 4/24/18: Pt was 80% accurate.   -HG     Patient will improve verbal expressive language skills by completing divergent naming tasks 80%:;with cues  -HG     Status: Patient will improve verbal expressive language skills by completing divergent naming tasks Progressing as expected  -HG     Comments: Patient will improve verbal expressive language skills by completing divergent naming tasks 5/3/18: for divergent naming, pt was 100% accurate.   -HG     Patient will improve verbal expressive language skills by forming grammatically correct sentences 80%:;with cues  -HG     Status: Patient will improve verbal expressive language skills by forming grammatically correct sentences Progressing as expected  -HG     Comments: Patient will improve verbal expressive language skills by forming grammatically correct sentences 5/3/18: For general conversation, pt was 80% accurate.   -HG        Dysarthria Goals    Patient will be able to engage in speech at the conversational level with maximum articulatory accuracy so that speech is understood by familiar /unfamiliar listeners 90%:;with  cues  -HG     Status: Patient will be able to engage in speech at the conversational level with maximum articulatory accuracy so that speech is understood by familiar /unfamiliar listeners Progressing as expected  -HG     Comments: Patient will be able to engage in speech at the conversational level with maximum articulatory accuracy so that speech is understood by familiar /unfamiliar listeners 5/3/18: Pt was 80% accurate with conversational speech. With cues, pt improved to 90%. 4/24/18: Pt is at least 90% intelligible.   -HG     Patient will improve comprehensibility of verbal messages by speaking at an appropriate vocal intensity 80%:;with cues  -HG     Status: Patient will improve comprehensibility of verbal messages by speaking at an appropriate vocal intensity Progressing as expected  -HG     Comments: Patient will improve comprehensibility of verbal messages by speaking at an appropriate vocal intensity 5/3/18: pt cued to increase volume in order to improve overall intelligibility and pt was 80% accurate.   -HG     Patient will maximize use of phonation to improve communication skills by using high phonatory effort in sentences 80%:;with cues  -HG     Status: Patient will maximize use of phonation to improve communication skills by using high phonatory effort in sentences Progressing as expected  -HG     Comments: Patient will maximize use of phonation to improve communication skills by using high phonatory effort in sentences 5/3/18: At the paragraph reading level, pt was 80% accurate, errors in /s/ blends were notable however pt used slow rate and increased volume and improved to at least 90% accuracy. 4/24/18: Pt's vocal intensity changes only by the end of the day and that is improving.   -HG        SLP Time Calculation    SLP Goal Re-Cert Due Date 06/02/18  -HG       User Key  (r) = Recorded By, (t) = Taken By, (c) = Cosigned By    Initials Name Provider Type    JOSY Mccann MS CCC-SLP Speech  and Language Pathologist                OP SLP Education     Row Name 05/03/18 1500       Education    Barriers to Learning No barriers identified  -HG    Education Provided Patient demonstrated recommended strategies;Patient requires further education on strategies, risks  -HG    Assessed Learning needs;Learning motivation;Learning preferences;Learning readiness  -    Learning Motivation Strong  -    Learning Method Explanation;Demonstration;Teach back;Written materials  -HG    Teaching Response Verbalized understanding;Demonstrated understanding;Reinforcement needed  -HG    Education Comments Pt givne homework for written and verbal expression.   -HG      User Key  (r) = Recorded By, (t) = Taken By, (c) = Cosigned By    Initials Name Effective Dates    HG Savannah Mccann MS CCC-SLP 06/22/15 -                 OP SLP Assessment/Plan - 05/03/18 1500        SLP Assessment    Clinical Impression Comments Assessment not re-given secondary to only third tx session.    -HG       SLP Plan    Frequency 2x/week  -HG    Duration 4 weeks  -HG    Planned CPT's? SLP INDIVIDUAL SPEECH THERAPY: 87706  -HG    Expected Duration Therapy Session - minutes 45-60 minutes  -HG    Plan Comments Cont with Language tx.  -HG      User Key  (r) = Recorded By, (t) = Taken By, (c) = Cosigned By    Initials Name Provider Type     Savannah Mccann MS CCC-SLP Speech and Language Pathologist                 Time Calculation:   SLP Start Time: 1500    Therapy Charges for Today     Code Description Service Date Service Provider Modifiers Qty    25400748635 HC ST MOTOR SPEECH CURRENT 5/3/2018 Savannah GRAVES Siobhan MS CCC-SLP GN, CJ 1    01190639150 HC ST MOTOR SPEECH GOAL STATUS 5/3/2018 Savannah Mccann MS CCC-SLP GN, CI 1    [id number] HC ST TREATMENT SPEECH 5 5/3/2018 Savannah STAR Mccann MS CCC-SLP GN 1          SLP G-Codes  Functional Limitations: Spoken language expressive  Motor Speech Current Status (): At least 20 percent but less  than 40 percent impaired, limited or restricted  Motor Speech Goal Status (): At least 1 percent but less than 20 percent impaired, limited or restricted        Savannah Mccann, MS CCC-SLP  5/3/2018

## 2018-05-07 ENCOUNTER — HOSPITAL ENCOUNTER (OUTPATIENT)
Dept: SPEECH THERAPY | Facility: HOSPITAL | Age: 78
Discharge: HOME | End: 2018-05-07
Admitting: INTERNAL MEDICINE

## 2018-05-07 DIAGNOSIS — I63.9 CEREBROVASCULAR ACCIDENT (CVA), UNSPECIFIED MECHANISM (HCC): Primary | ICD-10-CM

## 2018-05-07 PROCEDURE — 92507 TX SP LANG VOICE COMM INDIV: CPT

## 2018-05-07 NOTE — THERAPY TREATMENT NOTE
"Outpatient Speech Language Pathology   Adult Speech Language Cognitive Treatment Note  [facility]     Patient Name: Papo Ann  : 1940  MRN: [id number]  Today's Date: 2018         Visit Date: 2018   Patient Active Problem List   Diagnosis   • Prostate cancer   • Chronic kidney disease   • Hypertension   • Coronary artery disease involving native coronary artery of native heart without angina pectoris   • Cerebrovascular accident (CVA)   • Type 2 diabetes mellitus with complication, with long-term current use of insulin          Visit Dx:    ICD-10-CM ICD-9-CM   1. Cerebrovascular accident (CVA), unspecified mechanism I63.9 434.91                               SLP OP Goals     Row Name 18 1100          Goal Type Needed    Goal Type Needed Verbal Expression;Written Language Expression;Dysarthria  -HG        Subjective Comments    Subjective Comments Pt alert, cooperative  -HG        Written Language Expression Goals    Patient will be able to use written expressive language skills to communicate effectively in social/avocational/work setting 90%:;with cues  -HG     Status: Patient will be able to use written expressive language skills to communicate effectively in social/avocational/work setting Progressing as expected  -HG     Comments: Patient will be able to use written expressive language skills to communicate effectively in social/avocational/work setting 5/3/18: For written expression homework, pt is completing work with 80% accuracy.   -HG     Patient will improve written language skills by writing dictated short words 90%:;with cues  -HG     Status: Patient will improve written language skills by writing dictated short words Progressing as expected  -HG     Comments: Patient will improve written language skills by writing dictated short words 18: General \"wh\" question: pt was 80% accurate.  18: Pt answered wh questions with one word written answers and pt was 80% accurate. "  -HG     Patient will improve written language skills by writing name of pictures 90%:;with cues  -HG     Status: Patient will improve written language skills by writing name of pictures Progressing as expected  -HG     Comments: Patient will improve written language skills by writing name of pictures 4/24/18:    -HG     Patient will improve written language skills by completing printed phrases/sentences 80%:;with cues  -HG     Status: Patient will improve written language skills by completing printed phrases/sentences Progressing as expected  -HG     Comments: Patient will improve written language skills by completing printed phrases/sentences 5/3/18: Predictable Sentences: pt was 80% accurate. 4/24/18: Predictable Sentences: pt was 100% accurate.   -HG     Patient will improve written language skills by writing verb phrase to describe a picture 80%:;with cues  -HG     Status: Patient will improve written language skills by writing verb phrase to describe a picture Progressing as expected  -HG     Comments: Patient will improve written language skills by writing verb phrase to describe a picture 5/3/18: Producing sentences when given a target word: pt was 100% accurate. 4/24/18: Pt was able to produce 5 sentences about the Cookie Theft Picture.   -HG        Verbal Expression Goals    Patient will be able to use verbal expressive language skills to communicate effectively in all situations with unfamiliar listener 90%:;with cues  -HG     Status: Patient will be able to use verbal expressive language skills to communicate effectively in all situations with unfamiliar listener Progressing as expected  -HG     Comments: Patient will be able to use verbal expressive language skills to communicate effectively in all situations with unfamiliar listener 5/3/18: Pt states that his wife is the nearly the only person that doesn't understand him at times. 4/24/18: pt's wife states that he gets better and better everyday.  -HG      Patient will improve verbal expressive language skills by completing open-ended structured phrases/sentences 80%:;with cues  -HG     Status: Patient will improve verbal expressive language skills by completing open-ended structured phrases/sentences New;Progressing as expected  -HG     Comments: Patient will improve verbal expressive language skills by completing open-ended structured phrases/sentences 4/24/18: pt was 100% accurate for semi-predictable sentences.   -HG     Patient will improve verbal expressive language skills by generating a sentence when given a key word 80%:;with cues  -HG     Status: Patient will improve verbal expressive language skills by generating a sentence when given a key word New;Progressing as expected  -HG     Comments: Patient will improve verbal expressive language skills by generating a sentence when given a key word 5/7/18: Picture description: pt was 70% accurate with mod cues. 4/24/18: Pt was 80% accurate.   -HG     Patient will improve verbal expressive language skills by completing divergent naming tasks 80%:;with cues  -HG     Status: Patient will improve verbal expressive language skills by completing divergent naming tasks Progressing as expected  -HG     Comments: Patient will improve verbal expressive language skills by completing divergent naming tasks 5/7/18: The game of [employer]: pt was 80% accurate. 5/3/18: for divergent naming, pt was 100% accurate.   -HG     Patient will improve verbal expressive language skills by forming grammatically correct sentences 80%:;with cues  -HG     Status: Patient will improve verbal expressive language skills by forming grammatically correct sentences Progressing as expected  -HG     Comments: Patient will improve verbal expressive language skills by forming grammatically correct sentences 5/3/18: For general conversation, pt was 80% accurate.   -HG        Dysarthria Goals    Patient will be able to engage in speech at the  conversational level with maximum articulatory accuracy so that speech is understood by familiar /unfamiliar listeners 90%:;with cues  -HG     Status: Patient will be able to engage in speech at the conversational level with maximum articulatory accuracy so that speech is understood by familiar /unfamiliar listeners Progressing as expected  -HG     Comments: Patient will be able to engage in speech at the conversational level with maximum articulatory accuracy so that speech is understood by familiar /unfamiliar listeners 5/7/18: Conversational speech: pt was 80-90% intelligible. 5/3/18: Pt was 80% accurate with conversational speech. With cues, pt improved to 90%. 4/24/18: Pt is at least 90% intelligible.   -HG     Patient will improve comprehensibility of verbal messages by speaking at an appropriate vocal intensity 80%:;with cues  -HG     Status: Patient will improve comprehensibility of verbal messages by speaking at an appropriate vocal intensity Progressing as expected  -HG     Comments: Patient will improve comprehensibility of verbal messages by speaking at an appropriate vocal intensity 5/3/18: pt cued to increase volume in order to improve overall intelligibility and pt was 80% accurate.   -HG     Patient will maximize use of phonation to improve communication skills by using high phonatory effort in sentences 80%:;with cues  -HG     Status: Patient will maximize use of phonation to improve communication skills by using high phonatory effort in sentences Progressing as expected  -HG     Comments: Patient will maximize use of phonation to improve communication skills by using high phonatory effort in sentences 5/3/18: At the paragraph reading level, pt was 80% accurate, errors in /s/ blends were notable however pt used slow rate and increased volume and improved to at least 90% accuracy. 4/24/18: Pt's vocal intensity changes only by the end of the day and that is improving.   -HG        SLP Time Calculation     SLP Goal Re-Cert Due Date 06/02/18  -       User Key  (r) = Recorded By, (t) = Taken By, (c) = Cosigned By    Initials Name Provider Type    JOSY Savannah Mccann MS CCC-SLP Speech and Language Pathologist                OP SLP Education     Row Name 05/07/18 1100       Education    Education Comments Pt given a written expression task.   -      User Key  (r) = Recorded By, (t) = Taken By, (c) = Cosigned By    Initials Name Effective Dates    JOSY MS SEDRICK KasperSLP 06/22/15 -                 OP SLP Assessment/Plan - 05/07/18 1100        SLP Plan    Plan Comments Cont with Language tx.   -HG      User Key  (r) = Recorded By, (t) = Taken By, (c) = Cosigned By    Initials Name Provider Type    JOSY Mccann MS CCC-SLP Speech and Language Pathologist                 Time Calculation:   SLP Start Time: 1100    Therapy Charges for Today     Code Description Service Date Service Provider Modifiers Qty    [id number] HC ST TREATMENT SPEECH 4 5/7/2018 Savannah Mccann MS CCC-SLP GN 1                   MS WAAQS [place]  5/7/2018

## 2018-05-11 ENCOUNTER — HOSPITAL ENCOUNTER (OUTPATIENT)
Dept: SPEECH THERAPY | Facility: HOSPITAL | Age: 78
Setting detail: THERAPIES SERIES
Discharge: HOME | End: 2018-05-11
Attending: INTERNAL MEDICINE

## 2018-05-11 DIAGNOSIS — I63.9 CEREBROVASCULAR ACCIDENT (CVA), UNSPECIFIED MECHANISM (HCC): Primary | ICD-10-CM

## 2018-05-11 PROCEDURE — 92507 TX SP LANG VOICE COMM INDIV: CPT

## 2018-05-11 NOTE — THERAPY TREATMENT NOTE
Outpatient Speech Language Pathology   Adult Speech Language Cognitive Treatment Note   [facility]     Patient Name: Papo Ann  : 1940  MRN: [id number]  Today's Date: 2018         Visit Date: 2018   Patient Active Problem List   Diagnosis   • Prostate cancer   • Chronic kidney disease   • Hypertension   • Coronary artery disease involving native coronary artery of native heart without angina pectoris   • Cerebrovascular accident (CVA)   • Type 2 diabetes mellitus with complication, with long-term current use of insulin          Visit Dx:    ICD-10-CM ICD-9-CM   1. Cerebrovascular accident (CVA), unspecified mechanism I63.9 434.91                               SLP OP Goals     Row Name 18 1400          Goal Type Needed    Goal Type Needed Verbal Expression;Written Language Expression;Dysarthria  -HG        Subjective Comments    Subjective Comments Pt alert, cooperative, feeling pretty good about his language, concerned about dizziness.   -HG        Written Language Expression Goals    Patient will be able to use written expressive language skills to communicate effectively in social/avocational/work setting 90%:;with cues  -HG     Status: Patient will be able to use written expressive language skills to communicate effectively in social/avocational/work setting Achieved  -HG     Comments: Patient will be able to use written expressive language skills to communicate effectively in social/avocational/work setting 18: Written expression homework for answering questions and pt was 90% accurate. 5/3/18: For written expression homework, pt is completing work with 80% accuracy.   -HG     Patient will improve written language skills by writing dictated short words 90%:;with cues  -HG     Status: Patient will improve written language skills by writing dictated short words Progressing as expected  -HG     Comments: Patient will improve written language skills by writing dictated short words  "5/7/18: General \"wh\" question: pt was 80% accurate.  4/24/18: Pt answered wh questions with one word written answers and pt was 80% accurate.  -HG     Patient will improve written language skills by writing name of pictures 90%:;with cues  -HG     Status: Patient will improve written language skills by writing name of pictures Progressing as expected  -HG     Comments: Patient will improve written language skills by writing name of pictures 5/11/18: Written form of naming pictures: pt was 80% accurate.     -HG     Patient will improve written language skills by completing printed phrases/sentences 80%:;with cues  -HG     Status: Patient will improve written language skills by completing printed phrases/sentences Progressing as expected  -HG     Comments: Patient will improve written language skills by completing printed phrases/sentences 5/3/18: Predictable Sentences: pt was 80% accurate. 4/24/18: Predictable Sentences: pt was 100% accurate.   -HG     Patient will improve written language skills by writing verb phrase to describe a picture 80%:;with cues  -HG     Status: Patient will improve written language skills by writing verb phrase to describe a picture Progressing as expected  -HG     Comments: Patient will improve written language skills by writing verb phrase to describe a picture 5/3/18: Producing sentences when given a target word: pt was 100% accurate. 4/24/18: Pt was able to produce 5 sentences about the Cookie Theft Picture.   -HG        Verbal Expression Goals    Patient will be able to use verbal expressive language skills to communicate effectively in all situations with unfamiliar listener 90%:;with cues  -HG     Status: Patient will be able to use verbal expressive language skills to communicate effectively in all situations with unfamiliar listener Progressing as expected  -HG     Comments: Patient will be able to use verbal expressive language skills to communicate effectively in all situations " with unfamiliar listener 5/3/18: Pt states that his wife is the nearly the only person that doesn't understand him at times. 4/24/18: pt's wife states that he gets better and better everyday.  -HG     Patient will improve verbal expressive language skills by completing open-ended structured phrases/sentences 90%:;with cues  -HG     Status: Patient will improve verbal expressive language skills by completing open-ended structured phrases/sentences New;Progressing as expected  -HG     Comments: Patient will improve verbal expressive language skills by completing open-ended structured phrases/sentences 5/11/18: One word lead in for sentence completion: pt was 80% accurate. 4/24/18: pt was 100% accurate for semi-predictable sentences.   -HG     Patient will improve verbal expressive language skills by generating a sentence when given a key word 80%:;with cues  -HG     Status: Patient will improve verbal expressive language skills by generating a sentence when given a key word New;Progressing as expected  -HG     Comments: Patient will improve verbal expressive language skills by generating a sentence when given a key word 5/7/18: Picture description: pt was 70% accurate with mod cues. 4/24/18: Pt was 80% accurate.   -HG     Patient will improve verbal expressive language skills by completing divergent naming tasks 80%:;with cues  -HG     Status: Patient will improve verbal expressive language skills by completing divergent naming tasks Progressing as expected  -HG     Comments: Patient will improve verbal expressive language skills by completing divergent naming tasks 5/7/18: The game of Scettergories: pt was 80% accurate. 5/3/18: for divergent naming, pt was 100% accurate.   -HG     Patient will improve verbal expressive language skills by forming grammatically correct sentences 80%:;with cues  -HG     Status: Patient will improve verbal expressive language skills by forming grammatically correct sentences Progressing  as expected  -HG     Comments: Patient will improve verbal expressive language skills by forming grammatically correct sentences 5/11/18: For general conversation: pt was 90% accurate.  5/3/18: For general conversation, pt was 80% accurate.   -HG        Dysarthria Goals    Patient will be able to engage in speech at the conversational level with maximum articulatory accuracy so that speech is understood by familiar /unfamiliar listeners 90%:;with cues  -HG     Status: Patient will be able to engage in speech at the conversational level with maximum articulatory accuracy so that speech is understood by familiar /unfamiliar listeners Progressing as expected  -HG     Comments: Patient will be able to engage in speech at the conversational level with maximum articulatory accuracy so that speech is understood by familiar /unfamiliar listeners 5/11/18: Conversational speech: pt was 80-90% intelligible. 5/7/18: Conversational speech: pt was 80-90% intelligible. 5/3/18: Pt was 80% accurate with conversational speech. With cues, pt improved to 90%. 4/24/18: Pt is at least 90% intelligible.   -HG     Patient will improve comprehensibility of verbal messages by speaking at an appropriate vocal intensity 80%:;with cues   at a level of at least 75 dB.   -HG     Status: Patient will improve comprehensibility of verbal messages by speaking at an appropriate vocal intensity Progressing as expected  -HG     Comments: Patient will improve comprehensibility of verbal messages by speaking at an appropriate vocal intensity 5/11/18: Decibel meter used this date and pt was 72.6 dB average. 5/3/18: pt cued to increase volume in order to improve overall intelligibility and pt was 80% accurate.   -HG     Patient will maximize use of phonation to improve communication skills by using high phonatory effort in sentences 80%:;with cues  -HG     Status: Patient will maximize use of phonation to improve communication skills by using high phonatory  effort in sentences Progressing as expected  -     Comments: Patient will maximize use of phonation to improve communication skills by using high phonatory effort in sentences 5/3/18: At the paragraph reading level, pt was 80% accurate, errors in /s/ blends were notable however pt used slow rate and increased volume and improved to at least 90% accuracy. 4/24/18: Pt's vocal intensity changes only by the end of the day and that is improving.   -        SLP Time Calculation    SLP Goal Re-Cert Due Date 06/02/18  -HG       User Key  (r) = Recorded By, (t) = Taken By, (c) = Cosigned By    Initials Name Provider Type    JOSY Mccann MS CCC-SLP Speech and Language Pathologist                OP SLP Education     Row Name 05/11/18 1400       Education    Education Comments Pt given written expression language tasks.   -      User Key  (r) = Recorded By, (t) = Taken By, (c) = Cosigned By    Initials Name Effective Dates    MS WAQAS Vásquez 06/22/15 -                 OP SLP Assessment/Plan - 05/11/18 1400        SLP Plan    Plan Comments Cont with Language tx.   -      User Key  (r) = Recorded By, (t) = Taken By, (c) = Cosigned By    Initials Name Provider Type    JOSY Mccann MS CCC-SLP Speech and Language Pathologist                 Time Calculation:   SLP Start Time: 1400    Therapy Charges for Today     Code Description Service Date Service Provider Modifiers Qty    [id number]  ST TREATMENT SPEECH 5 5/11/2018 Savannah Mccann MS CCC-SLP GN 1                   Savannah Mccann MS CCC-RAINE  5/11/2018

## 2018-05-14 ENCOUNTER — HOSPITAL ENCOUNTER (OUTPATIENT)
Dept: SPEECH THERAPY | Facility: HOSPITAL | Age: 78
Setting detail: THERAPIES SERIES
Discharge: HOME | End: 2018-05-14
Attending: INTERNAL MEDICINE

## 2018-05-14 DIAGNOSIS — I63.9 CEREBROVASCULAR ACCIDENT (CVA), UNSPECIFIED MECHANISM (HCC): Primary | ICD-10-CM

## 2018-05-14 PROCEDURE — 92507 TX SP LANG VOICE COMM INDIV: CPT

## 2018-05-14 NOTE — THERAPY TREATMENT NOTE
Outpatient Speech Language Pathology   Adult Speech Language Cognitive Treatment Note  [facility]     Patient Name: Papo Ann  : 1940  MRN: [id number]  Today's Date: 2018         Visit Date: 2018   Patient Active Problem List   Diagnosis   • Prostate cancer   • Chronic kidney disease   • Hypertension   • Coronary artery disease involving native coronary artery of native heart without angina pectoris   • Cerebrovascular accident (CVA)   • Type 2 diabetes mellitus with complication, with long-term current use of insulin          Visit Dx:    ICD-10-CM ICD-9-CM   1. Cerebrovascular accident (CVA), unspecified mechanism I63.9 434.91                               SLP OP Goals     Row Name 18 1400          Goal Type Needed    Goal Type Needed Verbal Expression;Written Language Expression;Dysarthria  -HG        Subjective Comments    Subjective Comments Pt alert, cooperative, completed homework returned.   -HG        Written Language Expression Goals    Patient will be able to use written expressive language skills to communicate effectively in social/avocational/work setting 90%:;with cues  -HG     Status: Patient will be able to use written expressive language skills to communicate effectively in social/avocational/work setting Achieved  -HG     Comments: Patient will be able to use written expressive language skills to communicate effectively in social/avocational/work setting 18: Un-Scrambling sentences: pt was 70% accurate.  18: Written expression homework for answering questions and pt was 90% accurate. 5/3/18: For written expression homework, pt is completing work with 80% accuracy.   -HG     Patient will improve written language skills by writing dictated short words 90%:;with cues  -HG     Status: Patient will improve written language skills by writing dictated short words Progressing as expected  -HG     Comments: Patient will improve written language skills by writing  "dictated short words 5/7/18: General \"wh\" question: pt was 80% accurate.  4/24/18: Pt answered wh questions with one word written answers and pt was 80% accurate.  -HG     Patient will improve written language skills by writing name of pictures 90%:;with cues  -HG     Status: Patient will improve written language skills by writing name of pictures Progressing as expected  -HG     Comments: Patient will improve written language skills by writing name of pictures 5/11/18: Written form of naming pictures: pt was 80% accurate.     -HG     Patient will improve written language skills by completing printed phrases/sentences 80%:;with cues  -HG     Status: Patient will improve written language skills by completing printed phrases/sentences Progressing as expected  -HG     Comments: Patient will improve written language skills by completing printed phrases/sentences 5/14/18: Story Completion: pt was 90% accurate. 5/3/18: Predictable Sentences: pt was 80% accurate. 4/24/18: Predictable Sentences: pt was 100% accurate.   -HG     Patient will improve written language skills by writing verb phrase to describe a picture 80%:;with cues  -HG     Status: Patient will improve written language skills by writing verb phrase to describe a picture Progressing as expected  -HG     Comments: Patient will improve written language skills by writing verb phrase to describe a picture 5/3/18: Producing sentences when given a target word: pt was 100% accurate. 4/24/18: Pt was able to produce 5 sentences about the Cookie Theft Picture.   -HG        Verbal Expression Goals    Patient will be able to use verbal expressive language skills to communicate effectively in all situations with unfamiliar listener 90%:;with cues  -HG     Status: Patient will be able to use verbal expressive language skills to communicate effectively in all situations with unfamiliar listener Progressing as expected  -HG     Comments: Patient will be able to use verbal " expressive language skills to communicate effectively in all situations with unfamiliar listener 5/3/18: Pt states that his wife is the nearly the only person that doesn't understand him at times. 4/24/18: pt's wife states that he gets better and better everyday.  -HG     Patient will improve verbal expressive language skills by completing open-ended structured phrases/sentences 90%:;with cues  -HG     Status: Patient will improve verbal expressive language skills by completing open-ended structured phrases/sentences New;Progressing as expected  -HG     Comments: Patient will improve verbal expressive language skills by completing open-ended structured phrases/sentences 5/11/18: One word lead in for sentence completion: pt was 80% accurate. 4/24/18: pt was 100% accurate for semi-predictable sentences.   -HG     Patient will improve verbal expressive language skills by generating a sentence when given a key word 80%:;with cues  -HG     Status: Patient will improve verbal expressive language skills by generating a sentence when given a key word New;Progressing as expected  -HG     Comments: Patient will improve verbal expressive language skills by generating a sentence when given a key word 5/14/18: Sentence completion when given one target word: pt was 90% accurate. 5/7/18: Picture description: pt was 70% accurate with mod cues. 4/24/18: Pt was 80% accurate.   -HG     Patient will improve verbal expressive language skills by completing divergent naming tasks 80%:;with cues  -HG     Status: Patient will improve verbal expressive language skills by completing divergent naming tasks Progressing as expected  -HG     Comments: Patient will improve verbal expressive language skills by completing divergent naming tasks 5/7/18: The game of Scettergories: pt was 80% accurate. 5/3/18: for divergent naming, pt was 100% accurate.   -HG     Patient will improve verbal expressive language skills by forming grammatically correct  sentences 80%:;with cues  -HG     Status: Patient will improve verbal expressive language skills by forming grammatically correct sentences Progressing as expected  -HG     Comments: Patient will improve verbal expressive language skills by forming grammatically correct sentences 5/14/18: General conversation, pt was 90% accurate. 5/11/18: For general conversation: pt was 90% accurate.  5/3/18: For general conversation, pt was 80% accurate.   -HG        Dysarthria Goals    Patient will be able to engage in speech at the conversational level with maximum articulatory accuracy so that speech is understood by familiar /unfamiliar listeners 90%:;with cues  -HG     Status: Patient will be able to engage in speech at the conversational level with maximum articulatory accuracy so that speech is understood by familiar /unfamiliar listeners Progressing as expected  -HG     Comments: Patient will be able to engage in speech at the conversational level with maximum articulatory accuracy so that speech is understood by familiar /unfamiliar listeners 5/11/18: Conversational speech: pt was 80-90% intelligible. 5/7/18: Conversational speech: pt was 80-90% intelligible. 5/3/18: Pt was 80% accurate with conversational speech. With cues, pt improved to 90%. 4/24/18: Pt is at least 90% intelligible.   -HG     Patient will improve comprehensibility of verbal messages by speaking at an appropriate vocal intensity 80%:;with cues   at a level of at least 75 dB.   -HG     Status: Patient will improve comprehensibility of verbal messages by speaking at an appropriate vocal intensity Progressing as expected  -HG     Comments: Patient will improve comprehensibility of verbal messages by speaking at an appropriate vocal intensity 5/11/18: Decibel meter used this date and pt was 72.6 dB average. 5/3/18: pt cued to increase volume in order to improve overall intelligibility and pt was 80% accurate.   -HG     Patient will maximize use of  phonation to improve communication skills by using high phonatory effort in sentences 80%:;with cues  -HG     Status: Patient will maximize use of phonation to improve communication skills by using high phonatory effort in sentences Progressing as expected  -HG     Comments: Patient will maximize use of phonation to improve communication skills by using high phonatory effort in sentences 5/14/18: Paragraph reading, pt read [employer] and subjective recording info proves improvement in his articulation and pacing with 90% accuracy.  5/3/18: At the paragraph reading level, pt was 80% accurate, errors in /s/ blends were notable however pt used slow rate and increased volume and improved to at least 90% accuracy. 4/24/18: Pt's vocal intensity changes only by the end of the day and that is improving.   -HG        SLP Time Calculation    SLP Goal Re-Cert Due Date 06/02/18  -HG       User Key  (r) = Recorded By, (t) = Taken By, (c) = Cosigned By    Initials Name Provider Type    JOSY Mccann MS CCC-SLP Speech and Language Pathologist                    OP SLP Assessment/Plan - 05/14/18 1400        SLP Plan    Plan Comments Cont with Language tx.   -HG      User Key  (r) = Recorded By, (t) = Taken By, (c) = Cosigned By    Initials Name Provider Type    JOSY Mccann MS CCC-SLP Speech and Language Pathologist                 Time Calculation:   SLP Start Time: 1400    Therapy Charges for Today     Code Description Service Date Service Provider Modifiers Qty    [id number]  ST TREATMENT SPEECH 4 5/14/2018 Savannah Mccann MS CCC-SLP GN 1                   Savannah Mccann MS CCC-SLP  5/14/2018

## 2018-05-18 ENCOUNTER — HOSPITAL ENCOUNTER (OUTPATIENT)
Dept: SPEECH THERAPY | Facility: HOSPITAL | Age: 78
Setting detail: THERAPIES SERIES
Discharge: HOME | End: 2018-05-18
Attending: INTERNAL MEDICINE

## 2018-05-18 DIAGNOSIS — I63.9 CEREBROVASCULAR ACCIDENT (CVA), UNSPECIFIED MECHANISM (HCC): Primary | ICD-10-CM

## 2018-05-18 PROCEDURE — 92507 TX SP LANG VOICE COMM INDIV: CPT

## 2018-05-18 NOTE — THERAPY TREATMENT NOTE
"Outpatient Speech Language Pathology   Adult Speech Language Cognitive Treatment Note  [facility]     Patient Name: Papo Ann  : 1940  MRN: [id number]  Today's Date: 2018         Visit Date: 2018   Patient Active Problem List   Diagnosis   • Prostate cancer   • Chronic kidney disease   • Hypertension   • Coronary artery disease involving native coronary artery of native heart without angina pectoris   • Cerebrovascular accident (CVA)   • Type 2 diabetes mellitus with complication, with long-term current use of insulin          Visit Dx:    ICD-10-CM ICD-9-CM   1. Cerebrovascular accident (CVA), unspecified mechanism I63.9 434.91                               SLP OP Goals     Row Name 18 1400          Goal Type Needed    Goal Type Needed Verbal Expression  -HG        Subjective Comments    Subjective Comments Pt alert, cooperative, not feeling well this date.   -HG        Written Language Expression Goals    Patient will be able to use written expressive language skills to communicate effectively in social/avocational/work setting 90%:;with cues  -HG     Status: Patient will be able to use written expressive language skills to communicate effectively in social/avocational/work setting Achieved  -HG     Comments: Patient will be able to use written expressive language skills to communicate effectively in social/avocational/work setting 18: Un-Scrambling sentences: pt was 70% accurate.  18: Written expression homework for answering questions and pt was 90% accurate. 5/3/18: For written expression homework, pt is completing work with 80% accuracy.   -HG     Patient will improve written language skills by writing dictated short words 90%:;with cues  -HG     Status: Patient will improve written language skills by writing dictated short words Progressing as expected  -HG     Comments: Patient will improve written language skills by writing dictated short words 18: General \"wh\" " question: pt was 80% accurate.  4/24/18: Pt answered wh questions with one word written answers and pt was 80% accurate.  -HG     Patient will improve written language skills by writing name of pictures 90%:;with cues  -HG     Status: Patient will improve written language skills by writing name of pictures Progressing as expected  -HG     Comments: Patient will improve written language skills by writing name of pictures 5/11/18: Written form of naming pictures: pt was 80% accurate.     -HG     Patient will improve written language skills by completing printed phrases/sentences 80%:;with cues  -HG     Status: Patient will improve written language skills by completing printed phrases/sentences Progressing as expected  -HG     Comments: Patient will improve written language skills by completing printed phrases/sentences 5/18/18: Sentence Completion: pt was 97% accurate. 5/14/18: Story Completion: pt was 90% accurate. 5/3/18: Predictable Sentences: pt was 80% accurate. 4/24/18: Predictable Sentences: pt was 100% accurate.   -HG     Patient will improve written language skills by writing verb phrase to describe a picture 80%:;with cues  -HG     Status: Patient will improve written language skills by writing verb phrase to describe a picture Progressing as expected  -HG     Comments: Patient will improve written language skills by writing verb phrase to describe a picture 5/3/18: Producing sentences when given a target word: pt was 100% accurate. 4/24/18: Pt was able to produce 5 sentences about the Cookie Theft Picture.   -HG        Verbal Expression Goals    Patient will be able to use verbal expressive language skills to communicate effectively in all situations with unfamiliar listener 90%:;with cues  -HG     Status: Patient will be able to use verbal expressive language skills to communicate effectively in all situations with unfamiliar listener Progressing as expected  -HG     Comments: Patient will be able to use  verbal expressive language skills to communicate effectively in all situations with unfamiliar listener 5/3/18: Pt states that his wife is the nearly the only person that doesn't understand him at times. 4/24/18: pt's wife states that he gets better and better everyday.  -HG     Patient will improve verbal expressive language skills by completing open-ended structured phrases/sentences 90%:;with cues  -HG     Status: Patient will improve verbal expressive language skills by completing open-ended structured phrases/sentences New;Progressing as expected  -HG     Comments: Patient will improve verbal expressive language skills by completing open-ended structured phrases/sentences 5/11/18: One word lead in for sentence completion: pt was 80% accurate. 4/24/18: pt was 100% accurate for semi-predictable sentences.   -HG     Patient will improve verbal expressive language skills by generating a sentence when given a key word 80%:;with cues  -HG     Status: Patient will improve verbal expressive language skills by generating a sentence when given a key word New;Progressing as expected  -HG     Comments: Patient will improve verbal expressive language skills by generating a sentence when given a key word 5/14/18: Sentence completion when given one target word: pt was 90% accurate. 5/7/18: Picture description: pt was 70% accurate with mod cues. 4/24/18: Pt was 80% accurate.   -HG     Patient will improve verbal expressive language skills by completing divergent naming tasks 80%:;with cues  -HG     Status: Patient will improve verbal expressive language skills by completing divergent naming tasks Progressing as expected  -HG     Comments: Patient will improve verbal expressive language skills by completing divergent naming tasks 5/7/18: The game of Scettergories: pt was 80% accurate. 5/3/18: for divergent naming, pt was 100% accurate.   -HG     Patient will improve verbal expressive language skills by forming grammatically  correct sentences 80%:;with cues  -HG     Status: Patient will improve verbal expressive language skills by forming grammatically correct sentences Progressing as expected  -HG     Comments: Patient will improve verbal expressive language skills by forming grammatically correct sentences 5/18/18: Cold calls and pt was 90% accurate. 5/14/18: General conversation, pt was 90% accurate. 5/11/18: For general conversation: pt was 90% accurate.  5/3/18: For general conversation, pt was 80% accurate.   -HG        Dysarthria Goals    Patient will be able to engage in speech at the conversational level with maximum articulatory accuracy so that speech is understood by familiar /unfamiliar listeners 90%:;with cues  -HG     Status: Patient will be able to engage in speech at the conversational level with maximum articulatory accuracy so that speech is understood by familiar /unfamiliar listeners Progressing as expected  -HG     Comments: Patient will be able to engage in speech at the conversational level with maximum articulatory accuracy so that speech is understood by familiar /unfamiliar listeners 5/18/18: Conversational speech: pt required mod cues to use easy onset. 5/11/18: Conversational speech: pt was 80-90% intelligible. 5/7/18: Conversational speech: pt was 80-90% intelligible. 5/3/18: Pt was 80% accurate with conversational speech. With cues, pt improved to 90%. 4/24/18: Pt is at least 90% intelligible.   -HG     Patient will improve comprehensibility of verbal messages by speaking at an appropriate vocal intensity 80%:;with cues   at a level of at least 75 dB.   -HG     Status: Patient will improve comprehensibility of verbal messages by speaking at an appropriate vocal intensity Progressing as expected  -HG     Comments: Patient will improve comprehensibility of verbal messages by speaking at an appropriate vocal intensity 5/18/18: Decibel sound level meter for conversation, pt average at 72-75dB. 5/11/18: Decibel  meter used this date and pt was 72.6 dB average. 5/3/18: pt cued to increase volume in order to improve overall intelligibility and pt was 80% accurate.   -HG     Patient will maximize use of phonation to improve communication skills by using high phonatory effort in sentences 80%:;with cues  -HG     Status: Patient will maximize use of phonation to improve communication skills by using high phonatory effort in sentences Progressing as expected  -HG     Comments: Patient will maximize use of phonation to improve communication skills by using high phonatory effort in sentences 5/18/18: For paragraph reading this date, the mis-articulation of /s/ was noted along with tightness and therefore easy onset was taught and demonstrated with at least 50% accurate.  5/14/18: Paragraph reading, pt read [employer]age and subjective recording info proves improvement in his articulation and pacing with 90% accuracy.  5/3/18: At the paragraph reading level, pt was 80% accurate, errors in /s/ blends were notable however pt used slow rate and increased volume and improved to at least 90% accuracy. 4/24/18: Pt's vocal intensity changes only by the end of the day and that is improving.   -HG        SLP Time Calculation    SLP Goal Re-Cert Due Date 06/02/18  -HG       User Key  (r) = Recorded By, (t) = Taken By, (c) = Cosigned By    Initials Name Provider Type    JOSY GRAVES Siobhan MS [facility]-SLP Speech and Language Pathologist                OP SLP Education     Row Name 05/18/18 1400       Education    Education Comments Pt given homework to target /s/ in words and given an auditory download of SLP demonstrating the correct sound.   -      User Key  (r) = Recorded By, (t) = Taken By, (c) = Cosigned By    Initials Name Effective Dates    JOSY GRAVES MS SAYRA Mccann-SLP 06/22/15 -                 OP SLP Assessment/Plan - 05/18/18 1400        SLP Plan    Plan Comments Cont with Language tx.   -      User Key  (r) = Recorded  By, (t) = Taken By, (c) = Cosigned By    Initials Name Provider Type     Savannah Mccann MS CCC-SLP Speech and Language Pathologist                 Time Calculation:   SLP Start Time: 1400    Therapy Charges for Today     Code Description Service Date Service Provider Modifiers Qty    [id number]  ST TREATMENT SPEECH 4 5/18/2018 Savannah Mccann MS CCC-SLP GN 1                   Savannah Mccann MS CCC-SLP  5/18/2018

## 2018-05-21 ENCOUNTER — HOSPITAL ENCOUNTER (OUTPATIENT)
Dept: SPEECH THERAPY | Facility: HOSPITAL | Age: 78
Setting detail: THERAPIES SERIES
Discharge: HOME | End: 2018-05-21
Attending: INTERNAL MEDICINE

## 2018-05-21 DIAGNOSIS — I63.9 CEREBROVASCULAR ACCIDENT (CVA), UNSPECIFIED MECHANISM (HCC): Primary | ICD-10-CM

## 2018-05-21 PROCEDURE — 92507 TX SP LANG VOICE COMM INDIV: CPT

## 2018-05-21 NOTE — THERAPY TREATMENT NOTE
Outpatient Speech Language Pathology   Adult Speech Language Cognitive Treatment Note  [facility]     Patient Name: Papo Ann  : 1940  MRN: [id number]  Today's Date: 2018         Visit Date: 2018   Patient Active Problem List   Diagnosis   • Prostate cancer   • Chronic kidney disease   • Hypertension   • Coronary artery disease involving native coronary artery of native heart without angina pectoris   • Cerebrovascular accident (CVA)   • Type 2 diabetes mellitus with complication, with long-term current use of insulin          Visit Dx:    ICD-10-CM ICD-9-CM   1. Cerebrovascular accident (CVA), unspecified mechanism I63.9 434.91                               SLP OP Goals     Row Name 18 1400          Goal Type Needed    Goal Type Needed Verbal Expression  -HG        Subjective Comments    Subjective Comments Pt alert, cooperative, wife is not satisfied with his /s/ homework.   -HG        Written Language Expression Goals    Patient will be able to use written expressive language skills to communicate effectively in social/avocational/work setting 90%:;with cues  -HG     Status: Patient will be able to use written expressive language skills to communicate effectively in social/avocational/work setting Achieved  -HG     Comments: Patient will be able to use written expressive language skills to communicate effectively in social/avocational/work setting 18: Un-Scrambling sentences: pt was 70% accurate.  18: Written expression homework for answering questions and pt was 90% accurate. 5/3/18: For written expression homework, pt is completing work with 80% accuracy.   -HG     Patient will improve written language skills by writing dictated short words 90%:;with cues  -HG     Status: Patient will improve written language skills by writing dictated short words Progressing as expected  -HG     Comments: Patient will improve written language skills by writing dictated short words  "5/7/18: General \"wh\" question: pt was 80% accurate.  4/24/18: Pt answered wh questions with one word written answers and pt was 80% accurate.  -HG     Patient will improve written language skills by writing name of pictures 90%:;with cues  -HG     Status: Patient will improve written language skills by writing name of pictures Progressing as expected  -HG     Comments: Patient will improve written language skills by writing name of pictures 5/11/18: Written form of naming pictures: pt was 80% accurate.     -HG     Patient will improve written language skills by completing printed phrases/sentences 80%:;with cues  -HG     Status: Patient will improve written language skills by completing printed phrases/sentences Progressing as expected  -HG     Comments: Patient will improve written language skills by completing printed phrases/sentences 5/18/18: Sentence Completion: pt was 97% accurate. 5/14/18: Story Completion: pt was 90% accurate. 5/3/18: Predictable Sentences: pt was 80% accurate. 4/24/18: Predictable Sentences: pt was 100% accurate.   -HG     Patient will improve written language skills by writing verb phrase to describe a picture 80%:;with cues  -HG     Status: Patient will improve written language skills by writing verb phrase to describe a picture Progressing as expected  -HG     Comments: Patient will improve written language skills by writing verb phrase to describe a picture 5/3/18: Producing sentences when given a target word: pt was 100% accurate. 4/24/18: Pt was able to produce 5 sentences about the Cookie Theft Picture.   -HG        Verbal Expression Goals    Patient will be able to use verbal expressive language skills to communicate effectively in all situations with unfamiliar listener 90%:;with cues  -HG     Status: Patient will be able to use verbal expressive language skills to communicate effectively in all situations with unfamiliar listener Progressing as expected  -HG     Comments: Patient " will be able to use verbal expressive language skills to communicate effectively in all situations with unfamiliar listener 5/21/18: Implemented Fxnl Phrases for pt to use at home in order to promote improved language and articulation and pt was 80% accurate. 5/3/18: Pt states that his wife is the nearly the only person that doesn't understand him at times. 4/24/18: pt's wife states that he gets better and better everyday.  -HG     Patient will improve verbal expressive language skills by completing open-ended structured phrases/sentences 90%:;with cues  -HG     Status: Patient will improve verbal expressive language skills by completing open-ended structured phrases/sentences New;Progressing as expected  -HG     Comments: Patient will improve verbal expressive language skills by completing open-ended structured phrases/sentences 5/11/18: One word lead in for sentence completion: pt was 80% accurate. 4/24/18: pt was 100% accurate for semi-predictable sentences.   -HG     Patient will improve verbal expressive language skills by generating a sentence when given a key word 80%:;with cues  -HG     Status: Patient will improve verbal expressive language skills by generating a sentence when given a key word New;Progressing as expected  -HG     Comments: Patient will improve verbal expressive language skills by generating a sentence when given a key word 5/14/18: Sentence completion when given one target word: pt was 90% accurate. 5/7/18: Picture description: pt was 70% accurate with mod cues. 4/24/18: Pt was 80% accurate.   -HG     Patient will improve verbal expressive language skills by completing divergent naming tasks 80%:;with cues  -HG     Status: Patient will improve verbal expressive language skills by completing divergent naming tasks Progressing as expected  -HG     Comments: Patient will improve verbal expressive language skills by completing divergent naming tasks 5/7/18: The game of Scettergories: pt was 80%  accurate. 5/3/18: for divergent naming, pt was 100% accurate.   -HG     Patient will improve verbal expressive language skills by forming grammatically correct sentences 80%:;with cues  -HG     Status: Patient will improve verbal expressive language skills by forming grammatically correct sentences Progressing as expected  -HG     Comments: Patient will improve verbal expressive language skills by forming grammatically correct sentences 5/18/18: Cold calls and pt was 90% accurate. 5/14/18: General conversation, pt was 90% accurate. 5/11/18: For general conversation: pt was 90% accurate.  5/3/18: For general conversation, pt was 80% accurate.   -HG        Dysarthria Goals    Patient will be able to engage in speech at the conversational level with maximum articulatory accuracy so that speech is understood by familiar /unfamiliar listeners 90%:;with cues  -HG     Status: Patient will be able to engage in speech at the conversational level with maximum articulatory accuracy so that speech is understood by familiar /unfamiliar listeners Progressing as expected  -HG     Comments: Patient will be able to engage in speech at the conversational level with maximum articulatory accuracy so that speech is understood by familiar /unfamiliar listeners 5/21/18: Worked on inflection and over articulation and pt was 80% accurate. 5/18/18: Conversational speech: pt required mod cues to use easy onset. 5/11/18: Conversational speech: pt was 80-90% intelligible. 5/7/18: Conversational speech: pt was 80-90% intelligible. 5/3/18: Pt was 80% accurate with conversational speech. With cues, pt improved to 90%. 4/24/18: Pt is at least 90% intelligible.   -HG     Patient will improve comprehensibility of verbal messages by speaking at an appropriate vocal intensity 80%:;with cues   at a level of at least 75 dB.   -HG     Status: Patient will improve comprehensibility of verbal messages by speaking at an appropriate vocal intensity  Progressing as expected  -HG     Comments: Patient will improve comprehensibility of verbal messages by speaking at an appropriate vocal intensity 5/18/18: Decibel sound level meter for conversation, pt average at 72-75dB. 5/11/18: Decibel meter used this date and pt was 72.6 dB average. 5/3/18: pt cued to increase volume in order to improve overall intelligibility and pt was 80% accurate.   -HG     Patient will maximize use of phonation to improve communication skills by using high phonatory effort in sentences 80%:;with cues  -HG     Status: Patient will maximize use of phonation to improve communication skills by using high phonatory effort in sentences Progressing as expected  -HG     Comments: Patient will maximize use of phonation to improve communication skills by using high phonatory effort in sentences 5/21/18: /s/ words and pt was 70% accurate. Pt continues to protrude lips instead of retraction. 5/18/18: For paragraph reading this date, the mis-articulation of /s/ was noted along with tightness and therefore easy onset was taught and demonstrated with at least 50% accurate.  5/14/18: Paragraph reading, pt read [employer]age and subjective recording info proves improvement in his articulation and pacing with 90% accuracy.  5/3/18: At the paragraph reading level, pt was 80% accurate, errors in /s/ blends were notable however pt used slow rate and increased volume and improved to at least 90% accuracy. 4/24/18: Pt's vocal intensity changes only by the end of the day and that is improving.   -HG        SLP Time Calculation    SLP Goal Re-Cert Due Date 06/02/18  -       User Key  (r) = Recorded By, (t) = Taken By, (c) = Cosigned By    Initials Name Provider Type    JOSY Mccann MS [facility]-SLP Speech and Language Pathologist                OP SLP Education     Row Name 05/21/18 1400       Education    Education Comments Pt given fxnl phrases in order to improve language for common phrases said  every day.   -      User Key  (r) = Recorded By, (t) = Taken By, (c) = Cosigned By    Initials Name Effective Dates    JOSY Mccann MS CCC-SLP 06/22/15 -                 OP SLP Assessment/Plan - 05/21/18 1400        SLP Plan    Plan Comments Cont with Language tx.   -      User Key  (r) = Recorded By, (t) = Taken By, (c) = Cosigned By    Initials Name Provider Type    JOSY Mccann MS CCC-SLP Speech and Language Pathologist                 Time Calculation:   SLP Start Time: 1400    Therapy Charges for Today     Code Description Service Date Service Provider Modifiers Qty    [id number] HC ST TREATMENT SPEECH 5 5/21/2018 Savannah Mccann MS CCC-SLP GN 1                   Savannah Mccann MS CCC-SLP  5/21/2018

## 2018-05-23 ENCOUNTER — HOSPITAL ENCOUNTER (OUTPATIENT)
Dept: SPEECH THERAPY | Facility: HOSPITAL | Age: 78
Setting detail: THERAPIES SERIES
Discharge: HOME | End: 2018-05-23
Attending: INTERNAL MEDICINE

## 2018-05-23 DIAGNOSIS — I63.9 CEREBROVASCULAR ACCIDENT (CVA), UNSPECIFIED MECHANISM (HCC): Primary | ICD-10-CM

## 2018-05-23 PROCEDURE — 92507 TX SP LANG VOICE COMM INDIV: CPT

## 2018-05-23 NOTE — THERAPY TREATMENT NOTE
Outpatient Speech Language Pathology   Adult Speech Language Cognitive Treatment Note   [place]     Patient Name: Papo Ann  : 1940  MRN: [id number]  Today's Date: 2018         Visit Date: 2018   Patient Active Problem List   Diagnosis   • Prostate cancer   • Chronic kidney disease   • Hypertension   • Coronary artery disease involving native coronary artery of native heart without angina pectoris   • Cerebrovascular accident (CVA)   • Type 2 diabetes mellitus with complication, with long-term current use of insulin          Visit Dx:    ICD-10-CM ICD-9-CM   1. Cerebrovascular accident (CVA), unspecified mechanism I63.9 434.91                               SLP OP Goals     Row Name 18 1500          Goal Type Needed    Goal Type Needed Verbal Expression  -HG        Subjective Comments    Subjective Comments Pt alert, cooperative, completed his homework.   -HG        Written Language Expression Goals    Patient will be able to use written expressive language skills to communicate effectively in social/avocational/work setting 90%:;with cues  -HG     Status: Patient will be able to use written expressive language skills to communicate effectively in social/avocational/work setting Achieved  -HG     Comments: Patient will be able to use written expressive language skills to communicate effectively in social/avocational/work setting 18: Un-Scrambling sentences: pt was 70% accurate.  18: Written expression homework for answering questions and pt was 90% accurate. 5/3/18: For written expression homework, pt is completing work with 80% accuracy.   -HG     Patient will improve written language skills by writing dictated short words 90%:;with cues  -HG     Status: Patient will improve written language skills by writing dictated short words Progressing as expected  -HG     Comments: Patient will improve written language skills by writing dictated short words 18: 18: General  "\"wh\" question: pt was 80% accurate.  4/24/18: Pt answered wh questions with one word written answers and pt was 80% accurate.  -HG     Patient will improve written language skills by writing name of pictures 90%:;with cues  -HG     Status: Patient will improve written language skills by writing name of pictures Progressing as expected  -HG     Comments: Patient will improve written language skills by writing name of pictures 5/23/18: Sentence Construction for desribing safety pictures: pt was 80% accurate.  5/11/18: Written form of naming pictures: pt was 80% accurate.     -HG     Patient will improve written language skills by completing printed phrases/sentences 80%:;with cues  -HG     Status: Patient will improve written language skills by completing printed phrases/sentences Progressing as expected  -HG     Comments: Patient will improve written language skills by completing printed phrases/sentences 5/23/18: Sentence correction: pt was 100% accurate. 5/18/18: Sentence Completion: pt was 97% accurate. 5/14/18: Story Completion: pt was 90% accurate. 5/3/18: Predictable Sentences: pt was 80% accurate. 4/24/18: Predictable Sentences: pt was 100% accurate.   -HG     Patient will improve written language skills by writing verb phrase to describe a picture 80%:;with cues  -HG     Status: Patient will improve written language skills by writing verb phrase to describe a picture Progressing as expected  -HG     Comments: Patient will improve written language skills by writing verb phrase to describe a picture 5/3/18: Producing sentences when given a target word: pt was 100% accurate. 4/24/18: Pt was able to produce 5 sentences about the Cookie Theft Picture.   -HG        Verbal Expression Goals    Patient will be able to use verbal expressive language skills to communicate effectively in all situations with unfamiliar listener 90%:;with cues  -HG     Status: Patient will be able to use verbal expressive language skills " to communicate effectively in all situations with unfamiliar listener Progressing as expected  -HG     Comments: Patient will be able to use verbal expressive language skills to communicate effectively in all situations with unfamiliar listener 5/23/18: Fxnl phrases were loud and well articulated- /s/ was in error. 5/21/18: Implemented Fxnl Phrases for pt to use at home in order to promote improved language and articulation and pt was 80% accurate. 5/3/18: Pt states that his wife is the nearly the only person that doesn't understand him at times. 4/24/18: pt's wife states that he gets better and better everyday.  -HG     Patient will improve verbal expressive language skills by completing open-ended structured phrases/sentences 90%:;with cues  -HG     Status: Patient will improve verbal expressive language skills by completing open-ended structured phrases/sentences New;Progressing as expected  -HG     Comments: Patient will improve verbal expressive language skills by completing open-ended structured phrases/sentences 5/11/18: One word lead in for sentence completion: pt was 80% accurate. 4/24/18: pt was 100% accurate for semi-predictable sentences.   -HG     Patient will improve verbal expressive language skills by generating a sentence when given a key word 80%:;with cues  -HG     Status: Patient will improve verbal expressive language skills by generating a sentence when given a key word New;Progressing as expected  -HG     Comments: Patient will improve verbal expressive language skills by generating a sentence when given a key word 5/14/18: Sentence completion when given one target word: pt was 90% accurate. 5/7/18: Picture description: pt was 70% accurate with mod cues. 4/24/18: Pt was 80% accurate.   -HG     Patient will improve verbal expressive language skills by completing divergent naming tasks 80%:;with cues  -HG     Status: Patient will improve verbal expressive language skills by completing divergent  naming tasks Progressing as expected  -HG     Comments: Patient will improve verbal expressive language skills by completing divergent naming tasks 5/7/18: The game of Scettergories: pt was 80% accurate. 5/3/18: for divergent naming, pt was 100% accurate.   -HG     Patient will improve verbal expressive language skills by forming grammatically correct sentences 80%:;with cues  -HG     Status: Patient will improve verbal expressive language skills by forming grammatically correct sentences Progressing as expected  -HG     Comments: Patient will improve verbal expressive language skills by forming grammatically correct sentences 5/18/18: Cold calls and pt was 90% accurate. 5/14/18: General conversation, pt was 90% accurate. 5/11/18: For general conversation: pt was 90% accurate.  5/3/18: For general conversation, pt was 80% accurate.   -HG        Dysarthria Goals    Patient will be able to engage in speech at the conversational level with maximum articulatory accuracy so that speech is understood by familiar /unfamiliar listeners 90%:;with cues  -HG     Status: Patient will be able to engage in speech at the conversational level with maximum articulatory accuracy so that speech is understood by familiar /unfamiliar listeners Progressing as expected  -HG     Comments: Patient will be able to engage in speech at the conversational level with maximum articulatory accuracy so that speech is understood by familiar /unfamiliar listeners 5/23/18: Inflection sentences: pt was 80% accurate. 5/21/18: Worked on inflection and over articulation and pt was 80% accurate. 5/18/18: Conversational speech: pt required mod cues to use easy onset. 5/11/18: Conversational speech: pt was 80-90% intelligible. 5/7/18: Conversational speech: pt was 80-90% intelligible. 5/3/18: Pt was 80% accurate with conversational speech. With cues, pt improved to 90%. 4/24/18: Pt is at least 90% intelligible.   -HG     Patient will improve  comprehensibility of verbal messages by speaking at an appropriate vocal intensity 80%:;with cues   at a level of at least 75 dB.   -HG     Status: Patient will improve comprehensibility of verbal messages by speaking at an appropriate vocal intensity Progressing as expected  -HG     Comments: Patient will improve comprehensibility of verbal messages by speaking at an appropriate vocal intensity 5/18/18: Decibel sound level meter for conversation, pt average at 72-75dB. 5/11/18: Decibel meter used this date and pt was 72.6 dB average. 5/3/18: pt cued to increase volume in order to improve overall intelligibility and pt was 80% accurate.   -HG     Patient will maximize use of phonation to improve communication skills by using high phonatory effort in sentences 80%:;with cues  -HG     Status: Patient will maximize use of phonation to improve communication skills by using high phonatory effort in sentences Progressing as expected  -HG     Comments: Patient will maximize use of phonation to improve communication skills by using high phonatory effort in sentences 5/23/18: /s/ in words: pt was 50% accurate with max verbal and visual cues. 5/21/18: /s/ words and pt was 70% accurate. Pt continues to protrude lips instead of retraction. 5/18/18: For paragraph reading this date, the mis-articulation of /s/ was noted along with tightness and therefore easy onset was taught and demonstrated with at least 50% accurate.  5/14/18: Paragraph reading, pt read [employer] and subjective recording info proves improvement in his articulation and pacing with 90% accuracy.  5/3/18: At the paragraph reading level, pt was 80% accurate, errors in /s/ blends were notable however pt used slow rate and increased volume and improved to at least 90% accuracy. 4/24/18: Pt's vocal intensity changes only by the end of the day and that is improving.   -HG        SLP Time Calculation    SLP Goal Re-Cert Due Date 06/02/18  -HG       User  Key  (r) = Recorded By, (t) = Taken By, (c) = Cosigned By    Initials Name Provider Type    JOSY Savannah Mccann MS CCC-SLP Speech and Language Pathologist                OP SLP Education     Row Name 05/23/18 1500       Education    Education Comments Pt given scrambled sentences to unscramble at home as well as sentences for over articulation.   -HG      User Key  (r) = Recorded By, (t) = Taken By, (c) = Cosigned By    Initials Name Effective Dates    JOSY MS WAQAS Kasper 06/22/15 -                 OP SLP Assessment/Plan - 05/23/18 1500        SLP Plan    Plan Comments Cont with Language tx.   -HG      User Key  (r) = Recorded By, (t) = Taken By, (c) = Cosigned By    Initials Name Provider Type    JOSY Savannah Mccann MS CCC-SLP Speech and Language Pathologist                 Time Calculation:   SLP Start Time: 1500    Therapy Charges for Today     Code Description Service Date Service Provider Modifiers Qty    85959819448  ST TREATMENT SPEECH 4 5/23/2018 Savannah Mccann MS CCC-SLP GN 1                   MS WAQAS Shaffer  5/23/2018

## 2018-06-06 ENCOUNTER — HOSPITAL ENCOUNTER (OUTPATIENT)
Dept: SPEECH THERAPY | Facility: HOSPITAL | Age: 78
Setting detail: THERAPIES SERIES
Discharge: HOME | End: 2018-06-06
Attending: INTERNAL MEDICINE

## 2018-06-06 DIAGNOSIS — I63.9 CEREBROVASCULAR ACCIDENT (CVA), UNSPECIFIED MECHANISM (HCC): Primary | ICD-10-CM

## 2018-06-06 PROCEDURE — 92507 TX SP LANG VOICE COMM INDIV: CPT

## 2018-06-06 PROCEDURE — G9186 MOTOR SPEECH GOAL STATUS: HCPCS

## 2018-06-06 PROCEDURE — G8999 MOTOR SPEECH CURRENT STATUS: HCPCS

## 2018-06-06 NOTE — THERAPY PROGRESS REPORT/RE-CERT
"Outpatient Speech Language Pathology   Adult Speech Language Cognitive Progress Note  [facility]     Patient Name: Papo Ann  : 1940  MRN: [id number]  Today's Date: 2018         Visit Date: 2018   Patient Active Problem List   Diagnosis   • Prostate cancer   • Chronic kidney disease   • Hypertension   • Coronary artery disease involving native coronary artery of native heart without angina pectoris   • Cerebrovascular accident (CVA)   • Type 2 diabetes mellitus with complication, with long-term current use of insulin          Visit Dx:    ICD-10-CM ICD-9-CM   1. Cerebrovascular accident (CVA), unspecified mechanism I63.9 434.91                               SLP OP Goals     Row Name 18 0900          Goal Type Needed    Goal Type Needed Verbal Expression  -HG        Subjective Comments    Subjective Comments Pt alert, cooperative, stated, \"doing well\"  -HG        Written Language Expression Goals    Patient will be able to use written expressive language skills to communicate effectively in social/avocational/work setting 90%:;with cues  -HG     Status: Patient will be able to use written expressive language skills to communicate effectively in social/avocational/work setting Achieved  -HG     Comments: Patient will be able to use written expressive language skills to communicate effectively in social/avocational/work setting 18: Un-scrambling sentences: pt was 95% accurate. 18: Un-Scrambling sentences: pt was 70% accurate.  18: Written expression homework for answering questions and pt was 90% accurate. 5/3/18: For written expression homework, pt is completing work with 80% accuracy.   -HG     Patient will improve written language skills by writing dictated short words 90%:;with cues  -HG     Status: Patient will improve written language skills by writing dictated short words Progressing as expected  -HG     Comments: Patient will improve written language skills by writing " "dictated short words 6/6/18: Sentence re-construction and pt was 90% accurate.  5/7/18: General \"wh\" question: pt was 80% accurate.  4/24/18: Pt answered wh questions with one word written answers and pt was 80% accurate.  -HG     Patient will improve written language skills by writing name of pictures 90%:;with cues  -HG     Status: Patient will improve written language skills by writing name of pictures Progressing as expected  -HG     Comments: Patient will improve written language skills by writing name of pictures 5/23/18: Sentence Construction for desribing safety pictures: pt was 80% accurate.  5/11/18: Written form of naming pictures: pt was 80% accurate.     -HG     Patient will improve written language skills by completing printed phrases/sentences 80%:;with cues  -HG     Status: Patient will improve written language skills by completing printed phrases/sentences Progressing as expected  -HG     Comments: Patient will improve written language skills by completing printed phrases/sentences 5/23/18: Sentence correction: pt was 100% accurate. 5/18/18: Sentence Completion: pt was 97% accurate. 5/14/18: Story Completion: pt was 90% accurate. 5/3/18: Predictable Sentences: pt was 80% accurate. 4/24/18: Predictable Sentences: pt was 100% accurate.   -HG     Patient will improve written language skills by writing verb phrase to describe a picture 80%:;with cues  -HG     Status: Patient will improve written language skills by writing verb phrase to describe a picture Progressing as expected  -HG     Comments: Patient will improve written language skills by writing verb phrase to describe a picture 5/3/18: Producing sentences when given a target word: pt was 100% accurate. 4/24/18: Pt was able to produce 5 sentences about the Cookie Theft Picture.   -HG        Verbal Expression Goals    Patient will be able to use verbal expressive language skills to communicate effectively in all situations with unfamiliar listener " 90%:;with cues  -HG     Status: Patient will be able to use verbal expressive language skills to communicate effectively in all situations with unfamiliar listener Progressing as expected  -HG     Comments: Patient will be able to use verbal expressive language skills to communicate effectively in all situations with unfamiliar listener 5/23/18: Fxnl phrases were loud and well articulated- /s/ was in error. 5/21/18: Implemented Fxnl Phrases for pt to use at home in order to promote improved language and articulation and pt was 80% accurate. 5/3/18: Pt states that his wife is the nearly the only person that doesn't understand him at times. 4/24/18: pt's wife states that he gets better and better everyday.  -HG     Patient will improve verbal expressive language skills by completing open-ended structured phrases/sentences 90%:;with cues  -HG     Status: Patient will improve verbal expressive language skills by completing open-ended structured phrases/sentences New;Progressing as expected  -HG     Comments: Patient will improve verbal expressive language skills by completing open-ended structured phrases/sentences 5/11/18: One word lead in for sentence completion: pt was 80% accurate. 4/24/18: pt was 100% accurate for semi-predictable sentences.   -HG     Patient will improve verbal expressive language skills by generating a sentence when given a key word 90%:;with cues  -HG     Status: Patient will improve verbal expressive language skills by generating a sentence when given a key word New;Progressing as expected  -HG     Comments: Patient will improve verbal expressive language skills by generating a sentence when given a key word 6/6/18: Sentence completion: pt was 90% accurate. 5/14/18: Sentence completion when given one target word: pt was 90% accurate. 5/7/18: Picture description: pt was 70% accurate with mod cues. 4/24/18: Pt was 80% accurate.   -HG     Patient will improve verbal expressive language skills by  completing divergent naming tasks 80%:;with cues  -HG     Status: Patient will improve verbal expressive language skills by completing divergent naming tasks Progressing as expected  -HG     Comments: Patient will improve verbal expressive language skills by completing divergent naming tasks 6/6/18: The game of Scattergories: pt was 80% accurate. 5/7/18: The game of Scettergories: pt was 80% accurate. 5/3/18: for divergent naming, pt was 100% accurate.   -HG     Patient will improve verbal expressive language skills by forming grammatically correct sentences 80%:;with cues  -HG     Status: Patient will improve verbal expressive language skills by forming grammatically correct sentences Progressing as expected  -HG     Comments: Patient will improve verbal expressive language skills by forming grammatically correct sentences 5/18/18: Cold calls and pt was 90% accurate. 5/14/18: General conversation, pt was 90% accurate. 5/11/18: For general conversation: pt was 90% accurate.  5/3/18: For general conversation, pt was 80% accurate.   -HG        Dysarthria Goals    Patient will be able to engage in speech at the conversational level with maximum articulatory accuracy so that speech is understood by familiar /unfamiliar listeners 90%:;with cues  -HG     Status: Patient will be able to engage in speech at the conversational level with maximum articulatory accuracy so that speech is understood by familiar /unfamiliar listeners Progressing as expected  -HG     Comments: Patient will be able to engage in speech at the conversational level with maximum articulatory accuracy so that speech is understood by familiar /unfamiliar listeners 5/23/18: Inflection sentences: pt was 80% accurate. 5/21/18: Worked on inflection and over articulation and pt was 80% accurate. 5/18/18: Conversational speech: pt required mod cues to use easy onset. 5/11/18: Conversational speech: pt was 80-90% intelligible. 5/7/18: Conversational speech: pt  was 80-90% intelligible. 5/3/18: Pt was 80% accurate with conversational speech. With cues, pt improved to 90%. 4/24/18: Pt is at least 90% intelligible.   -HG     Patient will improve comprehensibility of verbal messages by speaking at an appropriate vocal intensity 80%:;with cues   at a level of at least 75 dB.   -HG     Status: Patient will improve comprehensibility of verbal messages by speaking at an appropriate vocal intensity Progressing as expected  -HG     Comments: Patient will improve comprehensibility of verbal messages by speaking at an appropriate vocal intensity 5/18/18: Decibel sound level meter for conversation, pt average at 72-75dB. 5/11/18: Decibel meter used this date and pt was 72.6 dB average. 5/3/18: pt cued to increase volume in order to improve overall intelligibility and pt was 80% accurate.   -HG     Patient will maximize use of phonation to improve communication skills by using high phonatory effort in sentences 80%:;with cues  -HG     Status: Patient will maximize use of phonation to improve communication skills by using high phonatory effort in sentences Progressing as expected  -HG     Comments: Patient will maximize use of phonation to improve communication skills by using high phonatory effort in sentences 5/23/18: /s/ in words: pt was 50% accurate with max verbal and visual cues. 5/21/18: /s/ words and pt was 70% accurate. Pt continues to protrude lips instead of retraction. 5/18/18: For paragraph reading this date, the mis-articulation of /s/ was noted along with tightness and therefore easy onset was taught and demonstrated with at least 50% accurate.  5/14/18: Paragraph reading, pt read [employer] Passage and subjective recording info proves improvement in his articulation and pacing with 90% accuracy.  5/3/18: At the paragraph reading level, pt was 80% accurate, errors in /s/ blends were notable however pt used slow rate and increased volume and improved to at least 90%  accuracy. 4/24/18: Pt's vocal intensity changes only by the end of the day and that is improving.   -        SLP Time Calculation    SLP Goal Re-Cert Due Date 07/06/18  -       User Key  (r) = Recorded By, (t) = Taken By, (c) = Cosigned By    Initials Name Provider Type    JOSY Mccann MS CCC-SLP Speech and Language Pathologist                OP SLP Education     Row Name 06/06/18 0900       Education    Barriers to Learning No barriers identified  -HG    Education Provided Patient demonstrated recommended strategies;Patient requires further education on strategies, risks  -    Assessed Learning needs;Learning motivation;Learning preferences;Learning readiness  -    Learning Motivation Strong  -    Learning Method Explanation;Demonstration;Teach back;Written materials  -    Teaching Response Verbalized understanding;Demonstrated understanding;Reinforcement needed  -    Education Comments Pt given homework for written expression.   -      User Key  (r) = Recorded By, (t) = Taken By, (c) = Cosigned By    Initials Name Effective Dates     Savannah Mccann MS SAYRA-SLP 06/22/15 -                 OP SLP Assessment/Plan - 06/06/18 0900        SLP Assessment    Functional Problems Speech Language- Adult/Cognition  -    Impact on Function: Adult Speech Language/Cognition Difficulty sequencing thoughts to express complex messages  -    Clinical Impression: Speech Language-Adult/Congnition Mild:;Expressive Aphasia;Dysarthria- Flaccid  -    Functional Problems Comment Pt continues to present with specific sounds in error: /s/ specifically and SLP is working to remediate the /s/.  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 SLP INDIVIDUAL SPEECH THERAPY: 95680  -    Expected Duration Therapy Session - minutes 45-60 minutes  -    Plan Comments Cont with Language tx.   -      User Key  (r) = Recorded By, (t) = Taken By, (c) = Cosigned By    Initials Name Provider Type    JOSY Mccann MS CCC-SLP Speech and Language Pathologist                 Time Calculation:   SLP Start Time: 0900    Therapy Charges for Today     Code Description Service Date Service Provider Modifiers Qty    13681348472 HC ST MOTOR SPEECH CURRENT 6/6/2018 Savannah Mccann MS CCC-SLP GN,  1    77446000220 HC ST MOTOR SPEECH GOAL STATUS 6/6/2018 Savannah Mccann MS CCC-SLP GN,  1    [id number] HC ST TREATMENT SPEECH 4 6/6/2018 Savannah Mccann MS CCC-SLP GN 1          SLP G-Codes  Functional Limitations: Spoken language expressive  Motor Speech Current Status (): At least 20 percent but less than 40 percent impaired, limited or restricted  Motor Speech Goal Status (): 0 percent impaired, limited or restricted        Savannah Mccann MS CCC-SLP  6/6/2018

## 2018-06-08 ENCOUNTER — DOCUMENTATION (OUTPATIENT)
Dept: PHYSICAL THERAPY | Facility: HOSPITAL | Age: 78
End: 2018-06-08

## 2018-06-08 DIAGNOSIS — I63.9 CEREBROVASCULAR ACCIDENT (CVA), UNSPECIFIED MECHANISM (HCC): Primary | ICD-10-CM

## 2018-06-08 NOTE — THERAPY DISCHARGE NOTE
Outpatient Physical Therapy Discharge Summary         Patient Name: Papo Ann  : 1940  MRN: [id number]    Today's Date: 2018    Visit Dx:    ICD-10-CM ICD-9-CM   1. Cerebrovascular accident (CVA), unspecified mechanism I63.9 434.91             PT OP Goals     Row Name 18 1313          PT Short Term Goals    STG Date to Achieve 18  -KL     STG 1 Patient will report compliance with HEP.  -KL     STG 1 Progress Not Met  -KL     STG 2 Client to improve mini-BEST test balance score to >/= 224/28 to decrease client's risk of falls.  -KL     STG 2 Progress Not Met  -KL        Long Term Goals    LTG Date to Achieve 18  -KL     LTG 1 Patient will be I with HEP.   -KL     LTG 1 Progress Not Met  -KL     LTG 2 Client to improve mini-BEST test balance score to >/= 27/28 to decrease client's risk of falls.  -KL     LTG 2 Progress Not Met  -KL       User Key  (r) = Recorded By, (t) = Taken By, (c) = Cosigned By    Initials Name Provider Type    MARIA INES Sharpe, PT Physical Therapist          OP PT Discharge Summary  Date of Discharge: 18  Reason for Discharge: other (comment)  Outcomes Achieved: Patient able to partially acheive established goals  Discharge Destination: Home with home program  Discharge Instructions/Additional Comments: Pt did not return to PT clinic for scheduled appointments.       Time Calculation:                    Shital Sharpe, PT  2018

## 2018-06-18 ENCOUNTER — HOSPITAL ENCOUNTER (OUTPATIENT)
Dept: SPEECH THERAPY | Facility: HOSPITAL | Age: 78
Setting detail: THERAPIES SERIES
Discharge: HOME | End: 2018-06-18
Attending: INTERNAL MEDICINE

## 2018-06-18 DIAGNOSIS — I63.9 CEREBROVASCULAR ACCIDENT (CVA), UNSPECIFIED MECHANISM (HCC): Primary | ICD-10-CM

## 2018-06-18 PROCEDURE — 92507 TX SP LANG VOICE COMM INDIV: CPT

## 2018-06-18 NOTE — THERAPY TREATMENT NOTE
"Outpatient Speech Language Pathology   Adult Speech Language Cognitive Treatment Note  [facility]     Patient Name: Papo Ann  : 1940  MRN: [id number]  Today's Date: 2018         Visit Date: 2018   Patient Active Problem List   Diagnosis   • Prostate cancer   • Chronic kidney disease   • Hypertension   • Coronary artery disease involving native coronary artery of native heart without angina pectoris   • Cerebrovascular accident (CVA)   • Type 2 diabetes mellitus with complication, with long-term current use of insulin          Visit Dx:    ICD-10-CM ICD-9-CM   1. Cerebrovascular accident (CVA), unspecified mechanism I63.9 434.91                               SLP OP Goals     Row Name 18 1000          Goal Type Needed    Goal Type Needed Verbal Expression  -HG        Subjective Comments    Subjective Comments Pt alert, cooperative, feels that his speech \"is doing well\".   -HG        Written Language Expression Goals    Patient will be able to use written expressive language skills to communicate effectively in social/avocational/work setting 90%:;with cues  -HG     Status: Patient will be able to use written expressive language skills to communicate effectively in social/avocational/work setting Achieved  -HG     Comments: Patient will be able to use written expressive language skills to communicate effectively in social/avocational/work setting 18: Un-scrambling sentences: pt was 95% accurate. 18: Un-Scrambling sentences: pt was 70% accurate.  18: Written expression homework for answering questions and pt was 90% accurate. 5/3/18: For written expression homework, pt is completing work with 80% accuracy.   -HG     Patient will improve written language skills by writing dictated short words 90%:;with cues  -HG     Status: Patient will improve written language skills by writing dictated short words Progressing as expected  -HG     Comments: Patient will improve written " "language skills by writing dictated short words 6/6/18: Sentence re-construction and pt was 90% accurate.  5/7/18: General \"wh\" question: pt was 80% accurate.  4/24/18: Pt answered wh questions with one word written answers and pt was 80% accurate.  -HG     Patient will improve written language skills by writing name of pictures 90%:;with cues  -HG     Status: Patient will improve written language skills by writing name of pictures Progressing as expected  -HG     Comments: Patient will improve written language skills by writing name of pictures  5/23/18: Sentence Construction for desribing safety pictures: pt was 80% accurate.  5/11/18: Written form of naming pictures: pt was 80% accurate.     -HG     Patient will improve written language skills by completing printed phrases/sentences 80%:;with cues  -HG     Status: Patient will improve written language skills by completing printed phrases/sentences Progressing as expected  -HG     Comments: Patient will improve written language skills by completing printed phrases/sentences 6/18/18: Answering Problem Solving Questions: pt was 80% accurate. 5/23/18: Sentence correction: pt was 100% accurate. 5/18/18: Sentence Completion: pt was 97% accurate. 5/14/18: Story Completion: pt was 90% accurate. 5/3/18: Predictable Sentences: pt was 80% accurate. 4/24/18: Predictable Sentences: pt was 100% accurate.   -HG     Patient will improve written language skills by writing verb phrase to describe a picture 80%:;with cues  -HG     Status: Patient will improve written language skills by writing verb phrase to describe a picture Progressing as expected  -HG     Comments: Patient will improve written language skills by writing verb phrase to describe a picture 6/18/18: Given an expansion writing task, pt was 100% accurate. 5/3/18: Producing sentences when given a target word: pt was 100% accurate. 4/24/18: Pt was able to produce 5 sentences about the Cookie Theft Picture.   -HG        " Verbal Expression Goals    Patient will be able to use verbal expressive language skills to communicate effectively in all situations with unfamiliar listener 90%:;with cues  -HG     Status: Patient will be able to use verbal expressive language skills to communicate effectively in all situations with unfamiliar listener Progressing as expected  -HG     Comments: Patient will be able to use verbal expressive language skills to communicate effectively in all situations with unfamiliar listener 5/23/18: Fxnl phrases were loud and well articulated- /s/ was in error. 5/21/18: Implemented Fxnl Phrases for pt to use at home in order to promote improved language and articulation and pt was 80% accurate. 5/3/18: Pt states that his wife is the nearly the only person that doesn't understand him at times. 4/24/18: pt's wife states that he gets better and better everyday.  -HG     Patient will improve verbal expressive language skills by completing open-ended structured phrases/sentences 90%:;with cues  -HG     Status: Patient will improve verbal expressive language skills by completing open-ended structured phrases/sentences New;Progressing as expected  -HG     Comments: Patient will improve verbal expressive language skills by completing open-ended structured phrases/sentences 6/18/18: Sentence formation when given one word: pt was 80% accurate- needed prompting to elaborate. 5/11/18: One word lead in for sentence completion: pt was 80% accurate. 4/24/18: pt was 100% accurate for semi-predictable sentences.   -HG     Patient will improve verbal expressive language skills by generating a sentence when given a key word 90%:;with cues  -HG     Status: Patient will improve verbal expressive language skills by generating a sentence when given a key word New;Progressing as expected  -HG     Comments: Patient will improve verbal expressive language skills by generating a sentence when given a key word 6/6/18: Sentence completion: pt  was 90% accurate. 5/14/18: Sentence completion when given one target word: pt was 90% accurate. 5/7/18: Picture description: pt was 70% accurate with mod cues. 4/24/18: Pt was 80% accurate.   -HG     Patient will improve verbal expressive language skills by completing divergent naming tasks 80%:;with cues  -HG     Status: Patient will improve verbal expressive language skills by completing divergent naming tasks Progressing as expected  -HG     Comments: Patient will improve verbal expressive language skills by completing divergent naming tasks 6/6/18: The game of Scattergories: pt was 80% accurate. 5/7/18: The game of Scettergories: pt was 80% accurate. 5/3/18: for divergent naming, pt was 100% accurate.   -HG     Patient will improve verbal expressive language skills by forming grammatically correct sentences 80%:;with cues  -HG     Status: Patient will improve verbal expressive language skills by forming grammatically correct sentences Progressing as expected  -HG     Comments: Patient will improve verbal expressive language skills by forming grammatically correct sentences 6/18/18: Cold call pt was 100% accurate.   5/18/18: Cold calls and pt was 90% accurate. 5/14/18: General conversation, pt was 90% accurate. 5/11/18: For general conversation: pt was 90% accurate.  5/3/18: For general conversation, pt was 80% accurate.   -HG        Dysarthria Goals    Patient will be able to engage in speech at the conversational level with maximum articulatory accuracy so that speech is understood by familiar /unfamiliar listeners 90%:;with cues  -HG     Status: Patient will be able to engage in speech at the conversational level with maximum articulatory accuracy so that speech is understood by familiar /unfamiliar listeners Progressing as expected  -HG     Comments: Patient will be able to engage in speech at the conversational level with maximum articulatory accuracy so that speech is understood by familiar /unfamiliar  listeners 6/18/18: Inflection at the conversation level: pt was appropriate with rise and fall with 90% accuracy. 5/23/18: Inflection sentences: pt was 80% accurate. 5/21/18: Worked on inflection and over articulation and pt was 80% accurate. 5/18/18: Conversational speech: pt required mod cues to use easy onset. 5/11/18: Conversational speech: pt was 80-90% intelligible. 5/7/18: Conversational speech: pt was 80-90% intelligible. 5/3/18: Pt was 80% accurate with conversational speech. With cues, pt improved to 90%. 4/24/18: Pt is at least 90% intelligible.   -HG     Patient will improve comprehensibility of verbal messages by speaking at an appropriate vocal intensity 80%:;with cues   at a level of at least 75 dB.   -HG     Status: Patient will improve comprehensibility of verbal messages by speaking at an appropriate vocal intensity Progressing as expected  -HG     Comments: Patient will improve comprehensibility of verbal messages by speaking at an appropriate vocal intensity 6/18/18: Decibel sound level meter at the reading level: pt ranged from 72-80 dB. 5/18/18: Decibel sound level meter for conversation, pt average at 72-75dB. 5/11/18: Decibel meter used this date and pt was 72.6 dB average. 5/3/18: pt cued to increase volume in order to improve overall intelligibility and pt was 80% accurate.   -HG     Patient will maximize use of phonation to improve communication skills by using high phonatory effort in sentences 80%:;with cues  -HG     Status: Patient will maximize use of phonation to improve communication skills by using high phonatory effort in sentences Progressing as expected  -HG     Comments: Patient will maximize use of phonation to improve communication skills by using high phonatory effort in sentences 5/23/18: /s/ in words: pt was 50% accurate with max verbal and visual cues. 5/21/18: /s/ words and pt was 70% accurate. Pt continues to protrude lips instead of retraction. 5/18/18: For paragraph  reading this date, the mis-articulation of /s/ was noted along with tightness and therefore easy onset was taught and demonstrated with at least 50% accurate.  5/14/18: Paragraph reading, pt read [employer] and subjective recording info proves improvement in his articulation and pacing with 90% accuracy.  5/3/18: At the paragraph reading level, pt was 80% accurate, errors in /s/ blends were notable however pt used slow rate and increased volume and improved to at least 90% accuracy. 4/24/18: Pt's vocal intensity changes only by the end of the day and that is improving.   -        SLP Time Calculation    SLP Goal Re-Cert Due Date 07/06/18  -HG       User Key  (r) = Recorded By, (t) = Taken By, (c) = Cosigned By    Initials Name Provider Type    JOSY Mccnan MS CCC-SLP Speech and Language Pathologist                OP SLP Education     Row Name 06/18/18 1000       Education    Education Comments Pt given written language homework  -      User Key  (r) = Recorded By, (t) = Taken By, (c) = Cosigned By    Initials Name Effective Dates    JOSY Mccann MS CCC-SLP 06/22/15 -                 OP SLP Assessment/Plan - 06/18/18 1000        SLP Plan    Plan Comments Cont with Language tx.   -      User Key  (r) = Recorded By, (t) = Taken By, (c) = Cosigned By    Initials Name Provider Type    JOSY Mccann MS CCC-SLP Speech and Language Pathologist                 Time Calculation:   SLP Start Time: 1000    Therapy Charges for Today     Code Description Service Date Service Provider Modifiers Qty    [id number]  ST TREATMENT SPEECH 4 6/18/2018 Savannah Mccann MS CCC-SLP GN 1                   Savannah Mccann MS CCC-SLP  6/18/2018

## 2018-07-02 ENCOUNTER — APPOINTMENT (OUTPATIENT)
Dept: SPEECH THERAPY | Facility: HOSPITAL | Age: 78
End: 2018-07-02
Attending: INTERNAL MEDICINE

## 2018-07-09 ENCOUNTER — APPOINTMENT (OUTPATIENT)
Dept: SPEECH THERAPY | Facility: HOSPITAL | Age: 78
End: 2018-07-09
Attending: INTERNAL MEDICINE

## 2018-07-12 ENCOUNTER — APPOINTMENT (OUTPATIENT)
Dept: SPEECH THERAPY | Facility: HOSPITAL | Age: 78
End: 2018-07-12
Attending: INTERNAL MEDICINE

## 2018-07-16 ENCOUNTER — HOSPITAL ENCOUNTER (OUTPATIENT)
Dept: SPEECH THERAPY | Facility: HOSPITAL | Age: 78
Setting detail: THERAPIES SERIES
Discharge: HOME | End: 2018-07-16
Attending: INTERNAL MEDICINE

## 2018-07-16 DIAGNOSIS — I63.9 CEREBROVASCULAR ACCIDENT (CVA), UNSPECIFIED MECHANISM (HCC): Primary | ICD-10-CM

## 2018-07-16 PROCEDURE — G8999 MOTOR SPEECH CURRENT STATUS: HCPCS

## 2018-07-16 PROCEDURE — 92507 TX SP LANG VOICE COMM INDIV: CPT

## 2018-07-16 PROCEDURE — G9186 MOTOR SPEECH GOAL STATUS: HCPCS

## 2018-07-30 ENCOUNTER — APPOINTMENT (OUTPATIENT)
Dept: SPEECH THERAPY | Facility: HOSPITAL | Age: 78
End: 2018-07-30
Attending: INTERNAL MEDICINE

## 2018-08-17 ENCOUNTER — HOSPITAL ENCOUNTER (OUTPATIENT)
Dept: SPEECH THERAPY | Facility: HOSPITAL | Age: 78
Setting detail: THERAPIES SERIES
Discharge: HOME | End: 2018-08-17
Attending: INTERNAL MEDICINE

## 2018-08-17 DIAGNOSIS — I63.9 CEREBROVASCULAR ACCIDENT (CVA), UNSPECIFIED MECHANISM (HCC): Primary | ICD-10-CM

## 2018-08-17 PROCEDURE — G9162 LANG EXPRESS CURRENT STATUS: HCPCS

## 2018-08-17 PROCEDURE — G9158 MOTOR SPEECH D/C STATUS: HCPCS

## 2018-08-17 PROCEDURE — 92507 TX SP LANG VOICE COMM INDIV: CPT

## 2018-08-17 PROCEDURE — G8999 MOTOR SPEECH CURRENT STATUS: HCPCS

## 2018-08-17 PROCEDURE — G9186 MOTOR SPEECH GOAL STATUS: HCPCS

## 2018-08-17 NOTE — THERAPY DISCHARGE NOTE
Outpatient Speech Language Pathology   Adult Speech Language Cognitive Progress Note/Discharge Summary   [place]     Patient Name: Papo Ann  : 1940  MRN: [id number]  Today's Date: 2018         Visit Date: 2018   Patient Active Problem List   Diagnosis   • Prostate cancer (CMS/HCC)   • Chronic kidney disease   • Hypertension   • Coronary artery disease involving native coronary artery of native heart without angina pectoris   • Cerebrovascular accident (CVA) (CMS/[facility])   • Type 2 diabetes mellitus with complication, with long-term current use of insulin (CMS/[facility])          Visit Dx:    ICD-10-CM ICD-9-CM   1. Cerebrovascular accident (CVA), unspecified mechanism (CMS/HCC) I63.9 434.91                             SLP OP Goals     Row Name 18 1500          Goal Type Needed    Goal Type Needed Verbal Expression  -HG        Subjective Comments    Subjective Comments Pt alert, cooperative, feels good about his current situation.   -HG        Written Language Expression Goals    Patient will be able to use written expressive language skills to communicate effectively in social/avocational/work setting 90%:;with cues  -HG     Status: Patient will be able to use written expressive language skills to communicate effectively in social/avocational/work setting Achieved  -HG     Comments: Patient will be able to use written expressive language skills to communicate effectively in social/avocational/work setting 18: Un-scrambling sentences: pt was 95% accurate. 18: Un-Scrambling sentences: pt was 70% accurate.  18: Written expression homework for answering questions and pt was 90% accurate. 5/3/18: For written expression homework, pt is completing work with 80% accuracy.   -HG     Patient will improve written language skills by writing dictated short words 90%:;with cues  -HG     Status: Patient will improve written language skills by writing dictated short words Progressing as  "expected  -HG     Comments: Patient will improve written language skills by writing dictated short words 8/17/18: Correcting Sentences: pt was 90% accurate. 6/6/18: Sentence re-construction and pt was 90% accurate.  5/7/18: General \"wh\" question: pt was 80% accurate.  4/24/18: Pt answered wh questions with one word written answers and pt was 80% accurate.  -HG     Patient will improve written language skills by writing name of pictures 90%:;with cues  -HG     Status: Patient will improve written language skills by writing name of pictures Progressing as expected  -HG     Comments: Patient will improve written language skills by writing name of pictures 7/16/18: Sentence construction for problem solving cards: pt was 80% accurate.  5/23/18: Sentence Construction for desribing safety pictures: pt was 80% accurate.  5/11/18: Written form of naming pictures: pt was 80% accurate.     -HG     Patient will improve written language skills by completing printed phrases/sentences 80%:;with cues  -HG     Status: Patient will improve written language skills by completing printed phrases/sentences Progressing as expected  -HG     Comments: Patient will improve written language skills by completing printed phrases/sentences 8/17/18: Problem solving: pt was 90% accurate. 7/16/18: Answering problem solving picture scenarios: pt was 80% accurate. 6/18/18: Answering Problem Solving Questions: pt was 80% accurate. 5/23/18: Sentence correction: pt was 100% accurate. 5/18/18: Sentence Completion: pt was 97% accurate. 5/14/18: Story Completion: pt was 90% accurate. 5/3/18: Predictable Sentences: pt was 80% accurate. 4/24/18: Predictable Sentences: pt was 100% accurate.   -HG     Patient will improve written language skills by writing verb phrase to describe a picture 80%:;with cues  -HG     Status: Patient will improve written language skills by writing verb phrase to describe a picture Progressing as expected  -HG     Comments: Patient " will improve written language skills by writing verb phrase to describe a picture 6/18/18: Given an expansion writing task, pt was 100% accurate. 5/3/18: Producing sentences when given a target word: pt was 100% accurate. 4/24/18: Pt was able to produce 5 sentences about the Cookie Theft Picture.   -HG        Verbal Expression Goals    Patient will be able to use verbal expressive language skills to communicate effectively in all situations with unfamiliar listener 90%:;with cues  -HG     Status: Patient will be able to use verbal expressive language skills to communicate effectively in all situations with unfamiliar listener Progressing as expected  -HG     Comments: Patient will be able to use verbal expressive language skills to communicate effectively in all situations with unfamiliar listener 8/17/18: Pt's verbal expressive skills were fxnl for conversation. 5/23/18: Fxnl phrases were loud and well articulated- /s/ was in error. 5/21/18: Implemented Fxnl Phrases for pt to use at home in order to promote improved language and articulation and pt was 80% accurate. 5/3/18: Pt states that his wife is the nearly the only person that doesn't understand him at times. 4/24/18: pt's wife states that he gets better and better everyday.  -HG     Status: Patient will be able to use verbal expressive language skills to communicate effectively in social/avocational/work setting Progressing as expected  -HG     Comments: Patient will be able to use verbal expressive language skills to communicate effectively in social/avocational/work setting 7/16/18: Pt is able to express wants and needs 90% of the times.   -HG     Patient will improve verbal expressive language skills by completing open-ended structured phrases/sentences 90%:;with cues  -HG     Status: Patient will improve verbal expressive language skills by completing open-ended structured phrases/sentences New;Progressing as expected  -HG     Comments: Patient will  improve verbal expressive language skills by completing open-ended structured phrases/sentences 8/17/18: Verbal expression given one word: Pt was 90% fluent.  7/16/18: Sentence formation given one word: pt was 90% accurate. 6/18/18: Sentence formation when given one word: pt was 80% accurate- needed prompting to elaborate. 5/11/18: One word lead in for sentence completion: pt was 80% accurate. 4/24/18: pt was 100% accurate for semi-predictable sentences.   -HG     Patient will improve verbal expressive language skills by generating a sentence when given a key word 90%:;with cues  -HG     Status: Patient will improve verbal expressive language skills by generating a sentence when given a key word Achieved  -HG     Comments: Patient will improve verbal expressive language skills by generating a sentence when given a key word 8/17/18: Sentence completion for predictable phrases and pt was 90% accurate. 7/16/18: Sentence compeletion: pt was 80% accurate. 6/6/18: Sentence completion: pt was 90% accurate. 5/14/18: Sentence completion when given one target word: pt was 90% accurate. 5/7/18: Picture description: pt was 70% accurate with mod cues. 4/24/18: Pt was 80% accurate.   -HG     Patient will improve verbal expressive language skills by completing divergent naming tasks 80%:;with cues  -HG     Status: Patient will improve verbal expressive language skills by completing divergent naming tasks Achieved  -HG     Comments: Patient will improve verbal expressive language skills by completing divergent naming tasks 8/17/18: Written answers for Improving a Situation or Institution and pt was 90% accurate. 6/6/18: The game of Scattergories: pt was 80% accurate. 5/7/18: The game of Scettergories: pt was 80% accurate. 5/3/18: for divergent naming, pt was 100% accurate.   -HG     Patient will improve verbal expressive language skills by forming grammatically correct sentences 80%:;with cues  -HG     Status: Patient will improve  verbal expressive language skills by forming grammatically correct sentences Progressing as expected  -HG     Comments: Patient will improve verbal expressive language skills by forming grammatically correct sentences 8/17/18: Conversation level during session and pt was 90% fluent. 6/18/18: Cold call pt was 100% accurate.   5/18/18: Cold calls and pt was 90% accurate. 5/14/18: General conversation, pt was 90% accurate. 5/11/18: For general conversation: pt was 90% accurate.  5/3/18: For general conversation, pt was 80% accurate.   -HG        Dysarthria Goals    Patient will be able to engage in speech at the conversational level with maximum articulatory accuracy so that speech is understood by familiar /unfamiliar listeners 90%:;with cues  -HG     Status: Patient will be able to engage in speech at the conversational level with maximum articulatory accuracy so that speech is understood by familiar /unfamiliar listeners Progressing as expected  -HG     Comments: Patient will be able to engage in speech at the conversational level with maximum articulatory accuracy so that speech is understood by familiar /unfamiliar listeners 8/17/18: Appropriate inflection at the conversation level for story telling. 6/18/18: Inflection at the conversation level: pt was appropriate with rise and fall with 90% accuracy. 5/23/18: Inflection sentences: pt was 80% accurate. 5/21/18: Worked on inflection and over articulation and pt was 80% accurate. 5/18/18: Conversational speech: pt required mod cues to use easy onset. 5/11/18: Conversational speech: pt was 80-90% intelligible. 5/7/18: Conversational speech: pt was 80-90% intelligible. 5/3/18: Pt was 80% accurate with conversational speech. With cues, pt improved to 90%. 4/24/18: Pt is at least 90% intelligible.   -HG     Patient will improve comprehensibility of verbal messages by speaking at an appropriate vocal intensity 80%:;with cues   at a level of at least 75 dB.   -HG      Status: Patient will improve comprehensibility of verbal messages by speaking at an appropriate vocal intensity Progressing as expected  -HG     Comments: Patient will improve comprehensibility of verbal messages by speaking at an appropriate vocal intensity 8/17/18: Average loudness at conversation level was 75 dB. 7/16/18: Average loudness at conversation level was 72.5 dB. 6/18/18: Decibel sound level meter at the reading level: pt ranged from 72-80 dB. 5/18/18: Decibel sound level meter for conversation, pt average at 72-75dB. 5/11/18: Decibel meter used this date and pt was 72.6 dB average. 5/3/18: pt cued to increase volume in order to improve overall intelligibility and pt was 80% accurate.   -HG     Patient will maximize use of phonation to improve communication skills by using high phonatory effort in sentences 80%:;with cues  -HG     Status: Patient will maximize use of phonation to improve communication skills by using high phonatory effort in sentences Progressing as expected;Discontinued  -HG     Comments: Patient will maximize use of phonation to improve communication skills by using high phonatory effort in sentences 7/16/18: Pt states that his wife can understand his /s/ sound at this time. 5/23/18: /s/ in words: pt was 50% accurate with max verbal and visual cues. 5/21/18: /s/ words and pt was 70% accurate. Pt continues to protrude lips instead of retraction. 5/18/18: For paragraph reading this date, the mis-articulation of /s/ was noted along with tightness and therefore easy onset was taught and demonstrated with at least 50% accurate.  5/14/18: Paragraph reading, pt read [employer] and subjective recording info proves improvement in his articulation and pacing with 90% accuracy.  5/3/18: At the paragraph reading level, pt was 80% accurate, errors in /s/ blends were notable however pt used slow rate and increased volume and improved to at least 90% accuracy. 4/24/18: Pt's vocal  intensity changes only by the end of the day and that is improving.   -        SLP Time Calculation    SLP Goal Re-Cert Due Date 09/16/18  -       User Key  (r) = Recorded By, (t) = Taken By, (c) = Cosigned By    Initials Name Provider Type    Savannah Schaefer MS CCC-SLP Speech and Language Pathologist                    OP SLP Assessment/Plan - 08/17/18 1500        SLP Assessment    Functional Problems Speech Language- Adult/Cognition  -HG    Clinical Impression: Speech Language-Adult/Congnition Minimal:;Expressive Aphasia;Dysarthria- Flaccid  -HG    Functional Problems Comment Pt continues to use fxnl language at the conversation level.   -       SLP Plan    Plan Comments D/C from skilled services at this time.   -      User Key  (r) = Recorded By, (t) = Taken By, (c) = Cosigned By    Initials Name Provider Type    Savannah Schaefer MS CCC-SLP Speech and Language Pathologist                   Time Calculation:   SLP Start Time: 1500    Therapy Charges for Today     Code Description Service Date Service Provider Modifiers Qty    43926503138 HC ST MOTOR SPEECH CURRENT 8/17/2018 Siobhan, Savannah GRAVES MS CCC-SLP GN, CJ 1    48880155764 HC ST MOTOR SPEECH GOAL STATUS 8/17/2018 Savannah Mccann MS CCC-SLP GN, CH 1    71560051564 HC ST SPOKEN LANG EXPRESS CURRENT 8/17/2018 SiobhanSavannah MS CCC-SLP GN, CI 1    76047312264 HC ST TREATMENT SPEECH 4 8/17/2018 Savannah Mccann MS CCC-SLP GN 1          SLP G-Codes  Functional Limitations: Spoken language expressive  Motor Speech Current Status (): At least 20 percent but less than 40 percent impaired, limited or restricted  Motor Speech Goal Status (): 0 percent impaired, limited or restricted  Spoken Language Expression Current Status (): At least 1 percent but less than 20 percent impaired, limited or restricted             Savannah Mccann MS CCC-SLP  8/17/2018

## 2022-03-14 DIAGNOSIS — C61 PROSTATE CANCER: Primary | ICD-10-CM

## 2022-03-16 ENCOUNTER — DOCUMENTATION (OUTPATIENT)
Dept: NUTRITION | Facility: HOSPITAL | Age: 82
End: 2022-03-16

## 2022-03-16 NOTE — PROGRESS NOTES
[facility] Nutrition    Referral received requesting nutrition consultation for fecal incontinence that patient has been experiencing since prostate cancer treatment in 2017.    Patient shares that he experienced significant issues with colitis many years ago.  He was able to manage it with Imodium, with issues finally resolved after a period of time.  He states that he maintained good bowel control until he underwent radiation for prostate cancer, at which time he developed fecal incontinence.  He generally has 3 bowel movements per day; notes that spontaneous bowel movements are associated with him bending over and lifting an item.    Reviewed his diet, which indicates that over the past several years, he has increased his consumption of high fat processed meats, which may be a contributing factor.  In addition to advising patient to decrease his intake of the processed meats, also recommended using a soluble fiber supplement such as Metamucil, Citracel or Benefiber and to include a probiotic to facilitate improvement in bowel management.     Instruction for soluble fiber supplementation was reviewed with the patient; good comprehension verbalized.    Patient encouraged to give the recommendation at least 2 weeks to note change and if the recommendations did not improve his incontinence, to call back for additional information in regards to diet modification.

## 2022-10-24 RX ORDER — ISOSORBIDE MONONITRATE 30 MG/1
TABLET, EXTENDED RELEASE ORAL
Qty: 90 TABLET | OUTPATIENT
Start: 2022-10-24

## 2023-01-17 RX ORDER — ISOSORBIDE MONONITRATE 30 MG/1
TABLET, EXTENDED RELEASE ORAL
Qty: 90 TABLET | OUTPATIENT
Start: 2023-01-17

## 2023-02-15 RX ORDER — ISOSORBIDE MONONITRATE 30 MG/1
TABLET, EXTENDED RELEASE ORAL
Qty: 30 TABLET | OUTPATIENT
Start: 2023-02-15

## 2025-08-25 ENCOUNTER — APPOINTMENT (OUTPATIENT)
Dept: GENERAL RADIOLOGY | Facility: HOSPITAL | Age: 85
End: 2025-08-25
Payer: MEDICARE

## 2025-08-25 ENCOUNTER — APPOINTMENT (OUTPATIENT)
Dept: CT IMAGING | Facility: HOSPITAL | Age: 85
End: 2025-08-25
Payer: MEDICARE

## 2025-08-25 ENCOUNTER — ANCILLARY PROCEDURE (OUTPATIENT)
Dept: EMERGENCY DEPT | Facility: HOSPITAL | Age: 85
End: 2025-08-25
Payer: MEDICARE

## 2025-08-25 ENCOUNTER — HOSPITAL ENCOUNTER (EMERGENCY)
Facility: HOSPITAL | Age: 85
Discharge: ANOTHER HOSPITAL | End: 2025-08-25
Attending: EMERGENCY MEDICINE | Admitting: EMERGENCY MEDICINE
Payer: MEDICARE

## 2025-08-25 VITALS
RESPIRATION RATE: 22 BRPM | BODY MASS INDEX: 23.54 KG/M2 | SYSTOLIC BLOOD PRESSURE: 126 MMHG | TEMPERATURE: 98 F | HEART RATE: 69 BPM | WEIGHT: 150 LBS | HEIGHT: 67 IN | DIASTOLIC BLOOD PRESSURE: 76 MMHG | OXYGEN SATURATION: 100 %

## 2025-08-25 DIAGNOSIS — S32.511A CLOSED FRACTURE OF SUPERIOR RAMUS OF RIGHT PUBIS, INITIAL ENCOUNTER: ICD-10-CM

## 2025-08-25 DIAGNOSIS — R73.9 HYPERGLYCEMIA: ICD-10-CM

## 2025-08-25 DIAGNOSIS — S51.011A SKIN TEAR OF RIGHT ELBOW WITHOUT COMPLICATION, INITIAL ENCOUNTER: ICD-10-CM

## 2025-08-25 DIAGNOSIS — S06.5XAA SDH (SUBDURAL HEMATOMA): Primary | ICD-10-CM

## 2025-08-25 DIAGNOSIS — S32.491A OTHER CLOSED FRACTURE OF RIGHT ACETABULUM, INITIAL ENCOUNTER: ICD-10-CM

## 2025-08-25 LAB
ABO GROUP BLD: NORMAL
ABO GROUP BLD: NORMAL
ALBUMIN SERPL-MCNC: 3.7 G/DL (ref 3.5–5.2)
ALBUMIN/GLOB SERPL: 2.2 G/DL
ALP SERPL-CCNC: 185 U/L (ref 39–117)
ALT SERPL W P-5'-P-CCNC: 39 U/L (ref 1–41)
ANION GAP SERPL CALCULATED.3IONS-SCNC: 9.7 MMOL/L (ref 5–15)
AST SERPL-CCNC: 30 U/L (ref 1–40)
BASOPHILS # BLD AUTO: 0.06 10*3/MM3 (ref 0–0.2)
BASOPHILS NFR BLD AUTO: 0.4 % (ref 0–1.5)
BILIRUB SERPL-MCNC: 0.3 MG/DL (ref 0–1.2)
BLD GP AB SCN SERPL QL: NEGATIVE
BUN SERPL-MCNC: 28.6 MG/DL (ref 8–23)
BUN/CREAT SERPL: 24.2 (ref 7–25)
CALCIUM SPEC-SCNC: 9.6 MG/DL (ref 8.6–10.5)
CHLORIDE SERPL-SCNC: 101 MMOL/L (ref 98–107)
CK SERPL-CCNC: 123 U/L (ref 20–200)
CO2 SERPL-SCNC: 25.3 MMOL/L (ref 22–29)
CREAT SERPL-MCNC: 1.18 MG/DL (ref 0.76–1.27)
DEPRECATED RDW RBC AUTO: 45.6 FL (ref 37–54)
EGFRCR SERPLBLD CKD-EPI 2021: 60.5 ML/MIN/1.73
EOSINOPHIL # BLD AUTO: 0.36 10*3/MM3 (ref 0–0.4)
EOSINOPHIL NFR BLD AUTO: 2.4 % (ref 0.3–6.2)
ERYTHROCYTE [DISTWIDTH] IN BLOOD BY AUTOMATED COUNT: 13.8 % (ref 12.3–15.4)
GLOBULIN UR ELPH-MCNC: 1.7 GM/DL
GLUCOSE SERPL-MCNC: 241 MG/DL (ref 65–99)
HCT VFR BLD AUTO: 36.3 % (ref 37.5–51)
HGB BLD-MCNC: 12.2 G/DL (ref 13–17.7)
IMM GRANULOCYTES # BLD AUTO: 0.08 10*3/MM3 (ref 0–0.05)
IMM GRANULOCYTES NFR BLD AUTO: 0.5 % (ref 0–0.5)
LYMPHOCYTES # BLD AUTO: 1.6 10*3/MM3 (ref 0.7–3.1)
LYMPHOCYTES NFR BLD AUTO: 10.5 % (ref 19.6–45.3)
MAGNESIUM SERPL-MCNC: 2 MG/DL (ref 1.6–2.4)
MCH RBC QN AUTO: 30.2 PG (ref 26.6–33)
MCHC RBC AUTO-ENTMCNC: 33.6 G/DL (ref 31.5–35.7)
MCV RBC AUTO: 89.9 FL (ref 79–97)
MONOCYTES # BLD AUTO: 1.01 10*3/MM3 (ref 0.1–0.9)
MONOCYTES NFR BLD AUTO: 6.6 % (ref 5–12)
NEUTROPHILS NFR BLD AUTO: 12.08 10*3/MM3 (ref 1.7–7)
NEUTROPHILS NFR BLD AUTO: 79.6 % (ref 42.7–76)
NRBC BLD AUTO-RTO: 0 /100 WBC (ref 0–0.2)
PLATELET # BLD AUTO: 219 10*3/MM3 (ref 140–450)
PMV BLD AUTO: 11.7 FL (ref 6–12)
POTASSIUM SERPL-SCNC: 4.8 MMOL/L (ref 3.5–5.2)
PROT SERPL-MCNC: 5.4 G/DL (ref 6–8.5)
RBC # BLD AUTO: 4.04 10*6/MM3 (ref 4.14–5.8)
RH BLD: POSITIVE
RH BLD: POSITIVE
SODIUM SERPL-SCNC: 136 MMOL/L (ref 136–145)
T&S EXPIRATION DATE: NORMAL
WBC NRBC COR # BLD AUTO: 15.19 10*3/MM3 (ref 3.4–10.8)

## 2025-08-25 PROCEDURE — 85025 COMPLETE CBC W/AUTO DIFF WBC: CPT | Performed by: EMERGENCY MEDICINE

## 2025-08-25 PROCEDURE — 36415 COLL VENOUS BLD VENIPUNCTURE: CPT

## 2025-08-25 PROCEDURE — 25010000002 LIDOCAINE PF 1% 1 % SOLUTION: Performed by: EMERGENCY MEDICINE

## 2025-08-25 PROCEDURE — 25010000002 NICARDIPINE 2.5 MG/ML SOLUTION 10 ML VIAL: Performed by: EMERGENCY MEDICINE

## 2025-08-25 PROCEDURE — 86900 BLOOD TYPING SEROLOGIC ABO: CPT | Performed by: EMERGENCY MEDICINE

## 2025-08-25 PROCEDURE — 25010000002 FENTANYL CITRATE (PF) 50 MCG/ML SOLUTION: Performed by: EMERGENCY MEDICINE

## 2025-08-25 PROCEDURE — 71045 X-RAY EXAM CHEST 1 VIEW: CPT

## 2025-08-25 PROCEDURE — 86900 BLOOD TYPING SEROLOGIC ABO: CPT

## 2025-08-25 PROCEDURE — 86901 BLOOD TYPING SEROLOGIC RH(D): CPT | Performed by: EMERGENCY MEDICINE

## 2025-08-25 PROCEDURE — 82550 ASSAY OF CK (CPK): CPT | Performed by: EMERGENCY MEDICINE

## 2025-08-25 PROCEDURE — 76942 ECHO GUIDE FOR BIOPSY: CPT | Performed by: EMERGENCY MEDICINE

## 2025-08-25 PROCEDURE — 96375 TX/PRO/DX INJ NEW DRUG ADDON: CPT

## 2025-08-25 PROCEDURE — 86901 BLOOD TYPING SEROLOGIC RH(D): CPT

## 2025-08-25 PROCEDURE — 72192 CT PELVIS W/O DYE: CPT

## 2025-08-25 PROCEDURE — 93005 ELECTROCARDIOGRAM TRACING: CPT | Performed by: EMERGENCY MEDICINE

## 2025-08-25 PROCEDURE — 83735 ASSAY OF MAGNESIUM: CPT | Performed by: EMERGENCY MEDICINE

## 2025-08-25 PROCEDURE — 70450 CT HEAD/BRAIN W/O DYE: CPT

## 2025-08-25 PROCEDURE — 73552 X-RAY EXAM OF FEMUR 2/>: CPT

## 2025-08-25 PROCEDURE — 25010000002 BUPIVACAINE (PF) 0.25 % SOLUTION: Performed by: EMERGENCY MEDICINE

## 2025-08-25 PROCEDURE — 25810000003 SODIUM CHLORIDE 0.9 % SOLUTION 250 ML FLEX CONT: Performed by: EMERGENCY MEDICINE

## 2025-08-25 PROCEDURE — 86850 RBC ANTIBODY SCREEN: CPT | Performed by: EMERGENCY MEDICINE

## 2025-08-25 PROCEDURE — 73080 X-RAY EXAM OF ELBOW: CPT

## 2025-08-25 PROCEDURE — 99291 CRITICAL CARE FIRST HOUR: CPT

## 2025-08-25 PROCEDURE — 72125 CT NECK SPINE W/O DYE: CPT

## 2025-08-25 PROCEDURE — 80053 COMPREHEN METABOLIC PANEL: CPT | Performed by: EMERGENCY MEDICINE

## 2025-08-25 PROCEDURE — 96365 THER/PROPH/DIAG IV INF INIT: CPT

## 2025-08-25 PROCEDURE — 72170 X-RAY EXAM OF PELVIS: CPT

## 2025-08-25 RX ORDER — BUPIVACAINE HYDROCHLORIDE 2.5 MG/ML
30 INJECTION, SOLUTION EPIDURAL; INFILTRATION; INTRACAUDAL; PERINEURAL ONCE
Status: COMPLETED | OUTPATIENT
Start: 2025-08-25 | End: 2025-08-25

## 2025-08-25 RX ORDER — CYCLOBENZAPRINE HCL 10 MG
5 TABLET ORAL ONCE
Status: COMPLETED | OUTPATIENT
Start: 2025-08-25 | End: 2025-08-25

## 2025-08-25 RX ORDER — LIDOCAINE HYDROCHLORIDE 10 MG/ML
10 INJECTION, SOLUTION EPIDURAL; INFILTRATION; INTRACAUDAL; PERINEURAL ONCE
Status: COMPLETED | OUTPATIENT
Start: 2025-08-25 | End: 2025-08-25

## 2025-08-25 RX ORDER — FENTANYL CITRATE 50 UG/ML
50 INJECTION, SOLUTION INTRAMUSCULAR; INTRAVENOUS ONCE
Refills: 0 | Status: COMPLETED | OUTPATIENT
Start: 2025-08-25 | End: 2025-08-25

## 2025-08-25 RX ORDER — HYDROMORPHONE HYDROCHLORIDE 1 MG/ML
0.25 INJECTION, SOLUTION INTRAMUSCULAR; INTRAVENOUS; SUBCUTANEOUS ONCE
Refills: 0 | Status: DISCONTINUED | OUTPATIENT
Start: 2025-08-25 | End: 2025-08-25

## 2025-08-25 RX ADMIN — LIDOCAINE HYDROCHLORIDE 5 ML: 10 INJECTION, SOLUTION EPIDURAL; INFILTRATION; INTRACAUDAL; PERINEURAL at 18:48

## 2025-08-25 RX ADMIN — FENTANYL CITRATE 50 MCG: 50 INJECTION, SOLUTION INTRAMUSCULAR; INTRAVENOUS at 19:29

## 2025-08-25 RX ADMIN — BUPIVACAINE HYDROCHLORIDE 30 ML: 2.5 INJECTION, SOLUTION EPIDURAL; INFILTRATION; INTRACAUDAL; PERINEURAL at 18:49

## 2025-08-25 RX ADMIN — NICARDIPINE HYDROCHLORIDE 5 MG/HR: 25 INJECTION INTRAVENOUS at 19:30

## 2025-08-25 RX ADMIN — CYCLOBENZAPRINE HYDROCHLORIDE 5 MG: 10 TABLET, FILM COATED ORAL at 20:50

## 2025-08-26 LAB
QT INTERVAL: 462 MS
QTC INTERVAL: 441 MS